# Patient Record
Sex: FEMALE | Race: WHITE | Employment: PART TIME | URBAN - METROPOLITAN AREA
[De-identification: names, ages, dates, MRNs, and addresses within clinical notes are randomized per-mention and may not be internally consistent; named-entity substitution may affect disease eponyms.]

---

## 2018-03-26 ENCOUNTER — HOSPITAL ENCOUNTER (INPATIENT)
Age: 58
LOS: 2 days | Discharge: HOME | DRG: 247 | End: 2018-03-28
Attending: EMERGENCY MEDICINE | Admitting: INTERNAL MEDICINE
Payer: OTHER GOVERNMENT

## 2018-03-26 ENCOUNTER — APPOINTMENT (OUTPATIENT)
Dept: GENERAL RADIOLOGY | Age: 58
DRG: 247 | End: 2018-03-26
Attending: INTERNAL MEDICINE
Payer: OTHER GOVERNMENT

## 2018-03-26 DIAGNOSIS — I25.119 CORONARY ARTERY DISEASE INVOLVING NATIVE HEART WITH ANGINA PECTORIS, UNSPECIFIED VESSEL OR LESION TYPE (HCC): ICD-10-CM

## 2018-03-26 DIAGNOSIS — R07.9 ACUTE CHEST PAIN: Primary | ICD-10-CM

## 2018-03-26 DIAGNOSIS — R77.8 ELEVATED TROPONIN: ICD-10-CM

## 2018-03-26 PROBLEM — E11.9 DIABETES MELLITUS TYPE 2, CONTROLLED (HCC): Status: ACTIVE | Noted: 2018-03-26

## 2018-03-26 PROBLEM — I21.4 NON-STEMI (NON-ST ELEVATED MYOCARDIAL INFARCTION) (HCC): Status: ACTIVE | Noted: 2018-03-26

## 2018-03-26 PROBLEM — I25.10 CAD (CORONARY ARTERY DISEASE), NATIVE CORONARY ARTERY: Status: ACTIVE | Noted: 2018-03-26

## 2018-03-26 LAB
ALBUMIN SERPL-MCNC: 3.2 G/DL (ref 3.4–5)
ALBUMIN/GLOB SERPL: 0.9 {RATIO} (ref 0.8–1.7)
ALP SERPL-CCNC: 124 U/L (ref 45–117)
ALT SERPL-CCNC: 105 U/L (ref 13–56)
ANION GAP SERPL CALC-SCNC: 6 MMOL/L (ref 3–18)
ANION GAP SERPL CALC-SCNC: 9 MMOL/L (ref 3–18)
AST SERPL-CCNC: 44 U/L (ref 15–37)
BASOPHILS # BLD: 0 K/UL (ref 0–0.06)
BASOPHILS NFR BLD: 0 % (ref 0–2)
BILIRUB DIRECT SERPL-MCNC: 0.1 MG/DL (ref 0–0.2)
BILIRUB SERPL-MCNC: 0.3 MG/DL (ref 0.2–1)
BUN SERPL-MCNC: 11 MG/DL (ref 7–18)
BUN SERPL-MCNC: 9 MG/DL (ref 7–18)
BUN/CREAT SERPL: 12 (ref 12–20)
BUN/CREAT SERPL: 14 (ref 12–20)
CALCIUM SERPL-MCNC: 8.6 MG/DL (ref 8.5–10.1)
CALCIUM SERPL-MCNC: 8.6 MG/DL (ref 8.5–10.1)
CHLORIDE SERPL-SCNC: 105 MMOL/L (ref 100–108)
CHLORIDE SERPL-SCNC: 108 MMOL/L (ref 100–108)
CK MB CFR SERPL CALC: 2.3 % (ref 0–4)
CK MB CFR SERPL CALC: 4.4 % (ref 0–4)
CK MB CFR SERPL CALC: 4.6 % (ref 0–4)
CK MB CFR SERPL CALC: 5.1 % (ref 0–4)
CK MB SERPL-MCNC: 3.4 NG/ML (ref 5–25)
CK MB SERPL-MCNC: 7.2 NG/ML (ref 5–25)
CK MB SERPL-MCNC: 8.7 NG/ML (ref 5–25)
CK MB SERPL-MCNC: 9.9 NG/ML (ref 5–25)
CK SERPL-CCNC: 149 U/L (ref 26–192)
CK SERPL-CCNC: 164 U/L (ref 26–192)
CK SERPL-CCNC: 188 U/L (ref 26–192)
CK SERPL-CCNC: 193 U/L (ref 26–192)
CO2 SERPL-SCNC: 27 MMOL/L (ref 21–32)
CO2 SERPL-SCNC: 30 MMOL/L (ref 21–32)
CREAT SERPL-MCNC: 0.73 MG/DL (ref 0.6–1.3)
CREAT SERPL-MCNC: 0.79 MG/DL (ref 0.6–1.3)
D DIMER PPP FEU-MCNC: 0.63 UG/ML(FEU)
DIFFERENTIAL METHOD BLD: ABNORMAL
EOSINOPHIL # BLD: 0.2 K/UL (ref 0–0.4)
EOSINOPHIL NFR BLD: 2 % (ref 0–5)
ERYTHROCYTE [DISTWIDTH] IN BLOOD BY AUTOMATED COUNT: 12.5 % (ref 11.6–14.5)
ERYTHROCYTE [DISTWIDTH] IN BLOOD BY AUTOMATED COUNT: 12.6 % (ref 11.6–14.5)
EST. AVERAGE GLUCOSE BLD GHB EST-MCNC: 111 MG/DL
GLOBULIN SER CALC-MCNC: 3.6 G/DL (ref 2–4)
GLUCOSE SERPL-MCNC: 107 MG/DL (ref 74–99)
GLUCOSE SERPL-MCNC: 136 MG/DL (ref 74–99)
HBA1C MFR BLD: 5.5 % (ref 4.5–5.6)
HCT VFR BLD AUTO: 44.3 % (ref 35–45)
HCT VFR BLD AUTO: 45.4 % (ref 35–45)
HGB BLD-MCNC: 15 G/DL (ref 12–16)
HGB BLD-MCNC: 15.4 G/DL (ref 12–16)
INR PPP: 0.9 (ref 0.8–1.2)
LYMPHOCYTES # BLD: 1.7 K/UL (ref 0.9–3.6)
LYMPHOCYTES NFR BLD: 15 % (ref 21–52)
MCH RBC QN AUTO: 32.2 PG (ref 24–34)
MCH RBC QN AUTO: 32.4 PG (ref 24–34)
MCHC RBC AUTO-ENTMCNC: 33.9 G/DL (ref 31–37)
MCHC RBC AUTO-ENTMCNC: 33.9 G/DL (ref 31–37)
MCV RBC AUTO: 95.1 FL (ref 74–97)
MCV RBC AUTO: 95.4 FL (ref 74–97)
MONOCYTES # BLD: 0.6 K/UL (ref 0.05–1.2)
MONOCYTES NFR BLD: 5 % (ref 3–10)
NEUTS SEG # BLD: 8.5 K/UL (ref 1.8–8)
NEUTS SEG NFR BLD: 78 % (ref 40–73)
PLATELET # BLD AUTO: 200 K/UL (ref 135–420)
PLATELET # BLD AUTO: 210 K/UL (ref 135–420)
PMV BLD AUTO: 9.5 FL (ref 9.2–11.8)
PMV BLD AUTO: 9.7 FL (ref 9.2–11.8)
POTASSIUM SERPL-SCNC: 3.5 MMOL/L (ref 3.5–5.5)
POTASSIUM SERPL-SCNC: 3.8 MMOL/L (ref 3.5–5.5)
PROT SERPL-MCNC: 6.8 G/DL (ref 6.4–8.2)
PROTHROMBIN TIME: 11.9 SEC (ref 11.5–15.2)
RBC # BLD AUTO: 4.66 M/UL (ref 4.2–5.3)
RBC # BLD AUTO: 4.76 M/UL (ref 4.2–5.3)
SODIUM SERPL-SCNC: 138 MMOL/L (ref 136–145)
SODIUM SERPL-SCNC: 147 MMOL/L (ref 136–145)
T4 FREE SERPL-MCNC: 1.7 NG/DL (ref 0.7–1.5)
TROPONIN I SERPL-MCNC: 0.28 NG/ML (ref 0–0.06)
TROPONIN I SERPL-MCNC: 1.02 NG/ML (ref 0–0.06)
TROPONIN I SERPL-MCNC: 1.14 NG/ML (ref 0–0.06)
TROPONIN I SERPL-MCNC: 1.44 NG/ML (ref 0–0.06)
TSH SERPL DL<=0.05 MIU/L-ACNC: 0.25 UIU/ML (ref 0.36–3.74)
WBC # BLD AUTO: 10.9 K/UL (ref 4.6–13.2)
WBC # BLD AUTO: 9.3 K/UL (ref 4.6–13.2)

## 2018-03-26 PROCEDURE — 93970 EXTREMITY STUDY: CPT

## 2018-03-26 PROCEDURE — 85379 FIBRIN DEGRADATION QUANT: CPT | Performed by: INTERNAL MEDICINE

## 2018-03-26 PROCEDURE — 36415 COLL VENOUS BLD VENIPUNCTURE: CPT | Performed by: INTERNAL MEDICINE

## 2018-03-26 PROCEDURE — 83036 HEMOGLOBIN GLYCOSYLATED A1C: CPT | Performed by: INTERNAL MEDICINE

## 2018-03-26 PROCEDURE — 80048 BASIC METABOLIC PNL TOTAL CA: CPT | Performed by: INTERNAL MEDICINE

## 2018-03-26 PROCEDURE — 99284 EMERGENCY DEPT VISIT MOD MDM: CPT

## 2018-03-26 PROCEDURE — 93306 TTE W/DOPPLER COMPLETE: CPT

## 2018-03-26 PROCEDURE — 71045 X-RAY EXAM CHEST 1 VIEW: CPT

## 2018-03-26 PROCEDURE — 84443 ASSAY THYROID STIM HORMONE: CPT | Performed by: INTERNAL MEDICINE

## 2018-03-26 PROCEDURE — 74011250636 HC RX REV CODE- 250/636: Performed by: EMERGENCY MEDICINE

## 2018-03-26 PROCEDURE — 74011250637 HC RX REV CODE- 250/637: Performed by: INTERNAL MEDICINE

## 2018-03-26 PROCEDURE — 65660000000 HC RM CCU STEPDOWN

## 2018-03-26 PROCEDURE — 85610 PROTHROMBIN TIME: CPT | Performed by: INTERNAL MEDICINE

## 2018-03-26 PROCEDURE — 80076 HEPATIC FUNCTION PANEL: CPT | Performed by: INTERNAL MEDICINE

## 2018-03-26 PROCEDURE — 85025 COMPLETE CBC W/AUTO DIFF WBC: CPT | Performed by: EMERGENCY MEDICINE

## 2018-03-26 PROCEDURE — 82550 ASSAY OF CK (CPK): CPT | Performed by: EMERGENCY MEDICINE

## 2018-03-26 PROCEDURE — 84439 ASSAY OF FREE THYROXINE: CPT | Performed by: INTERNAL MEDICINE

## 2018-03-26 PROCEDURE — 93005 ELECTROCARDIOGRAM TRACING: CPT

## 2018-03-26 PROCEDURE — 80048 BASIC METABOLIC PNL TOTAL CA: CPT | Performed by: EMERGENCY MEDICINE

## 2018-03-26 PROCEDURE — 96372 THER/PROPH/DIAG INJ SC/IM: CPT

## 2018-03-26 PROCEDURE — 74011250636 HC RX REV CODE- 250/636: Performed by: INTERNAL MEDICINE

## 2018-03-26 PROCEDURE — 85027 COMPLETE CBC AUTOMATED: CPT | Performed by: INTERNAL MEDICINE

## 2018-03-26 PROCEDURE — 94762 N-INVAS EAR/PLS OXIMTRY CONT: CPT

## 2018-03-26 RX ORDER — INSULIN LISPRO 100 [IU]/ML
INJECTION, SOLUTION INTRAVENOUS; SUBCUTANEOUS
Status: DISCONTINUED | OUTPATIENT
Start: 2018-03-26 | End: 2018-03-26

## 2018-03-26 RX ORDER — ENOXAPARIN SODIUM 100 MG/ML
1 INJECTION SUBCUTANEOUS EVERY 12 HOURS
Status: DISCONTINUED | OUTPATIENT
Start: 2018-03-26 | End: 2018-03-26

## 2018-03-26 RX ORDER — SODIUM CHLORIDE 0.9 % (FLUSH) 0.9 %
5-10 SYRINGE (ML) INJECTION AS NEEDED
Status: DISCONTINUED | OUTPATIENT
Start: 2018-03-26 | End: 2018-03-28 | Stop reason: HOSPADM

## 2018-03-26 RX ORDER — ASPIRIN 325 MG
325 TABLET ORAL DAILY
COMMUNITY
End: 2018-03-28

## 2018-03-26 RX ORDER — SODIUM CHLORIDE 0.9 % (FLUSH) 0.9 %
5-10 SYRINGE (ML) INJECTION EVERY 8 HOURS
Status: DISCONTINUED | OUTPATIENT
Start: 2018-03-26 | End: 2018-03-28 | Stop reason: HOSPADM

## 2018-03-26 RX ORDER — SIMVASTATIN 20 MG/1
20 TABLET, FILM COATED ORAL
Status: DISCONTINUED | OUTPATIENT
Start: 2018-03-26 | End: 2018-03-28 | Stop reason: HOSPADM

## 2018-03-26 RX ORDER — GUAIFENESIN 100 MG/5ML
81 LIQUID (ML) ORAL DAILY
Status: DISCONTINUED | OUTPATIENT
Start: 2018-03-27 | End: 2018-03-26 | Stop reason: SDUPTHER

## 2018-03-26 RX ORDER — NITROGLYCERIN 0.4 MG/1
0.4 TABLET SUBLINGUAL
Status: DISCONTINUED | OUTPATIENT
Start: 2018-03-26 | End: 2018-03-28 | Stop reason: HOSPADM

## 2018-03-26 RX ORDER — DEXTROSE 50 % IN WATER (D50W) INTRAVENOUS SYRINGE
25-50 AS NEEDED
Status: DISCONTINUED | OUTPATIENT
Start: 2018-03-26 | End: 2018-03-28 | Stop reason: HOSPADM

## 2018-03-26 RX ORDER — ENOXAPARIN SODIUM 100 MG/ML
1 INJECTION SUBCUTANEOUS
Status: COMPLETED | OUTPATIENT
Start: 2018-03-26 | End: 2018-03-26

## 2018-03-26 RX ORDER — LEVOTHYROXINE SODIUM 125 UG/1
125 TABLET ORAL
Status: DISCONTINUED | OUTPATIENT
Start: 2018-03-26 | End: 2018-03-28 | Stop reason: HOSPADM

## 2018-03-26 RX ORDER — ASPIRIN 325 MG
325 TABLET ORAL DAILY
Status: DISCONTINUED | OUTPATIENT
Start: 2018-03-26 | End: 2018-03-26

## 2018-03-26 RX ORDER — MORPHINE SULFATE 4 MG/ML
5 INJECTION INTRAVENOUS
Status: DISCONTINUED | OUTPATIENT
Start: 2018-03-26 | End: 2018-03-28 | Stop reason: HOSPADM

## 2018-03-26 RX ORDER — LISINOPRIL 5 MG/1
5 TABLET ORAL DAILY
Status: DISCONTINUED | OUTPATIENT
Start: 2018-03-26 | End: 2018-03-28 | Stop reason: HOSPADM

## 2018-03-26 RX ORDER — ACETAMINOPHEN 500 MG
500 TABLET ORAL
Status: DISCONTINUED | OUTPATIENT
Start: 2018-03-26 | End: 2018-03-28 | Stop reason: HOSPADM

## 2018-03-26 RX ORDER — DOCUSATE SODIUM 100 MG/1
100 CAPSULE, LIQUID FILLED ORAL 2 TIMES DAILY
Status: DISCONTINUED | OUTPATIENT
Start: 2018-03-26 | End: 2018-03-28 | Stop reason: HOSPADM

## 2018-03-26 RX ORDER — CARVEDILOL 3.12 MG/1
6.25 TABLET ORAL 2 TIMES DAILY WITH MEALS
Status: DISCONTINUED | OUTPATIENT
Start: 2018-03-26 | End: 2018-03-28 | Stop reason: HOSPADM

## 2018-03-26 RX ORDER — LEVOTHYROXINE SODIUM 125 UG/1
125 TABLET ORAL
COMMUNITY
End: 2018-11-09

## 2018-03-26 RX ORDER — ENOXAPARIN SODIUM 100 MG/ML
30 INJECTION SUBCUTANEOUS ONCE
Status: DISCONTINUED | OUTPATIENT
Start: 2018-03-26 | End: 2018-03-26

## 2018-03-26 RX ORDER — SODIUM CHLORIDE 9 MG/ML
75 INJECTION, SOLUTION INTRAVENOUS CONTINUOUS
Status: DISCONTINUED | OUTPATIENT
Start: 2018-03-27 | End: 2018-03-28 | Stop reason: HOSPADM

## 2018-03-26 RX ORDER — MAGNESIUM SULFATE 100 %
4 CRYSTALS MISCELLANEOUS AS NEEDED
Status: DISCONTINUED | OUTPATIENT
Start: 2018-03-26 | End: 2018-03-28 | Stop reason: HOSPADM

## 2018-03-26 RX ORDER — ADHESIVE BANDAGE
30 BANDAGE TOPICAL DAILY PRN
Status: DISCONTINUED | OUTPATIENT
Start: 2018-03-26 | End: 2018-03-28 | Stop reason: HOSPADM

## 2018-03-26 RX ORDER — GUAIFENESIN 100 MG/5ML
81 LIQUID (ML) ORAL DAILY
Status: DISCONTINUED | OUTPATIENT
Start: 2018-03-27 | End: 2018-03-28 | Stop reason: HOSPADM

## 2018-03-26 RX ORDER — NITROGLYCERIN 0.4 MG/1
TABLET SUBLINGUAL
COMMUNITY

## 2018-03-26 RX ADMIN — CARVEDILOL 6.25 MG: 3.12 TABLET, FILM COATED ORAL at 08:36

## 2018-03-26 RX ADMIN — Medication 10 ML: at 17:26

## 2018-03-26 RX ADMIN — ASPIRIN 325 MG ORAL TABLET 325 MG: 325 PILL ORAL at 08:36

## 2018-03-26 RX ADMIN — Medication 10 ML: at 06:50

## 2018-03-26 RX ADMIN — ENOXAPARIN SODIUM 80 MG: 80 INJECTION SUBCUTANEOUS at 03:11

## 2018-03-26 RX ADMIN — SIMVASTATIN 20 MG: 20 TABLET, FILM COATED ORAL at 06:48

## 2018-03-26 RX ADMIN — Medication 10 ML: at 22:43

## 2018-03-26 RX ADMIN — LISINOPRIL 5 MG: 5 TABLET ORAL at 08:36

## 2018-03-26 RX ADMIN — SIMVASTATIN 20 MG: 20 TABLET, FILM COATED ORAL at 22:43

## 2018-03-26 RX ADMIN — LEVOTHYROXINE SODIUM 125 MCG: 125 TABLET ORAL at 06:48

## 2018-03-26 RX ADMIN — ENOXAPARIN SODIUM 80 MG: 80 INJECTION SUBCUTANEOUS at 17:26

## 2018-03-26 RX ADMIN — CARVEDILOL 6.25 MG: 3.12 TABLET, FILM COATED ORAL at 17:26

## 2018-03-26 NOTE — PROGRESS NOTES
4981 Assumed care of pt from [facility] 76. . Pt resting quietly in bed , no signs of distress, call bell within reach. 0830 Assessment completed. Pt denies chest pain or discomfort. Agitated because she is on a diabetic consistent carb diet and is not diabetic, will get it changed. -per Dr Owens Him okay to changed diet to Regular     1408 Critical troponin 1.44, Dr Aidan Beltre and Dr Denia Cornell aware, no orders received. 1740 Critical troponin 1.14. Paged Dr Denia Cornell    Shift Summary- Shift uneventful. Pt rested throughout shift. Denied chest pain or shortness of breath.  Per Dr Denia Cornell okay to not call him for critical troponin's unless they start trending back up

## 2018-03-26 NOTE — ED PROVIDER NOTES
EMERGENCY DEPARTMENT HISTORY AND PHYSICAL EXAM    Date: 3/26/2018  Patient Name: Stephanie Lopez    History of Presenting Illness     Chief Complaint   Patient presents with    Chest Pain         History Provided By: Patient    Chief Complaint: Chest pain that radiates to the jaw  Duration: 3 hours   Timing:  Acute and Improving  Location: Chest and Jaw  Quality: Pressure  Severity: 5 out of 10  Modifying Factors: Notes great relief with NTG (given by cardiologist) and ASA. Associated Symptoms: denies any other associated signs or symptoms    Additional History (Context):   1:33 AM  Stephanie Lopez is a 61 y.o. female with PMHx HTN, DM, and thyroid disease who presents ambulatory to the emergency department C/O constant, pressured, chest pain that radiates to the jaw (rated 5/10), onset 3 hours ago which resolved in ER. Notes great relief with NTG (given by cardiologist) and ASA taken 2 hours ago. Notes no other associated sxs. Pt is a  and states that pain began at work. Pt was evaluated by cardiology in 2008, had a stress test and cardiac catheterization that revealed a minor blockage which did require treatment. Endorses tobacco use (1 ppd). Notes FHx of CHF in father (< 61). Denies any h/o DM. Pt denies SOB, diaphoresis, N/V, heavy lifting, physical exertion, EtOH/illicit drug use, or any other sxs or complaints. PCP: None    Past History     Past Medical History:  Past Medical History:   Diagnosis Date    Diabetes ([address]     Endocrine disease     Hypertension        Past Surgical History:  Past Surgical History:   Procedure Laterality Date    HX GYN      HX ORTHOPAEDIC         Family History:  History reviewed. No pertinent family history.     Social History:  Social History   Substance Use Topics    Smoking status: Current Every Day Smoker     Packs/day: 1.00    Smokeless tobacco: Never Used    Alcohol use No       Allergies:  No Known Allergies      Review of Systems   Review of Systems   Constitutional: Negative for chills, diaphoresis, fever and unexpected weight change. HENT: Negative for congestion, drooling, ear pain, rhinorrhea, sore throat, tinnitus and trouble swallowing.         (+) Jaw pain   Eyes: Negative for photophobia, pain, redness and visual disturbance. Respiratory: Negative for cough, choking, chest tightness, shortness of breath, wheezing and stridor. Cardiovascular: Positive for chest pain. Negative for palpitations and leg swelling. Gastrointestinal: Negative for abdominal distention, abdominal pain, anal bleeding, blood in stool, constipation, diarrhea, nausea and vomiting. Genitourinary: Negative for difficulty urinating, dysuria, flank pain, frequency, hematuria and urgency. Musculoskeletal: Negative for arthralgias, back pain and neck pain. Skin: Negative for color change, rash and wound. Neurological: Negative for dizziness, seizures, syncope, speech difficulty, light-headedness and headaches. Hematological: Does not bruise/bleed easily. Psychiatric/Behavioral: Negative for agitation, behavioral problems, hallucinations, self-injury and suicidal ideas. The patient is not hyperactive. Physical Exam     Vitals:    03/26/18 0101 03/26/18 0245   BP: (!) 183/103 156/87   Pulse: 79 78   Resp: 16 18   Temp: 97.9 °F (36.6 °C)    SpO2: 99% 96%   Weight: 81.6 kg (180 lb)    Height: 5' 5\" (1.651 m)      Physical Exam   Constitutional: She is oriented to person, place, and time. She appears well-developed and well-nourished. No distress. HENT:   Head: Normocephalic and atraumatic. Right Ear: External ear normal.   Left Ear: External ear normal.   Mouth/Throat: Oropharynx is clear and moist. No oropharyngeal exudate. Eyes: Conjunctivae and EOM are normal. Pupils are equal, round, and reactive to light. No scleral icterus. No pallor   Neck: Normal range of motion. Neck supple. No JVD present. No tracheal deviation present.  No thyromegaly present. Cardiovascular: Normal rate, regular rhythm and normal heart sounds. Pulmonary/Chest: Effort normal and breath sounds normal. No stridor. No respiratory distress. Abdominal: Soft. Bowel sounds are normal. She exhibits no distension. There is no tenderness. There is no rebound and no guarding. Musculoskeletal: Normal range of motion. She exhibits no edema or tenderness. No soft tissue injuries   Lymphadenopathy:     She has no cervical adenopathy. Neurological: She is alert and oriented to person, place, and time. She has normal reflexes. No cranial nerve deficit. Coordination normal.   Skin: Skin is warm and dry. No rash noted. She is not diaphoretic. No erythema. Psychiatric: She has a normal mood and affect. Her behavior is normal. Judgment and thought content normal.   Nursing note and vitals reviewed.     Diagnostic Study Results     Labs -     Recent Results (from the past 12 hour(s))   EKG, 12 LEAD, INITIAL    Collection Time: 03/26/18 12:56 AM   Result Value Ref Range    Ventricular Rate 76 BPM    Atrial Rate 76 BPM    P-R Interval 138 ms    QRS Duration 84 ms    Q-T Interval 402 ms    QTC Calculation (Bezet) 452 ms    Calculated P Axis 61 degrees    Calculated R Axis 23 degrees    Calculated T Axis 47 degrees    Diagnosis       Normal sinus rhythm  Possible Left atrial enlargement  Borderline ECG  When compared with ECG of 28-FEB-2010 17:13,  No significant change was found     METABOLIC PANEL, BASIC    Collection Time: 03/26/18  1:30 AM   Result Value Ref Range    Sodium 147 (H) 136 - 145 mmol/L    Potassium 3.8 3.5 - 5.5 mmol/L    Chloride 108 100 - 108 mmol/L    CO2 30 21 - 32 mmol/L    Anion gap 9 3.0 - 18 mmol/L    Glucose 136 (H) 74 - 99 mg/dL    BUN 11 7.0 - 18 MG/DL    Creatinine 0.79 0.6 - 1.3 MG/DL    BUN/Creatinine ratio 14 12 - 20      GFR est AA >60 >60 ml/min/1.73m2    GFR est non-AA >60 >60 ml/min/1.73m2    Calcium 8.6 8.5 - 10.1 MG/DL   CBC WITH AUTOMATED DIFF Collection Time: 03/26/18  1:30 AM   Result Value Ref Range    WBC 10.9 4.6 - 13.2 K/uL    RBC 4.76 4.20 - 5.30 M/uL    HGB 15.4 12.0 - 16.0 g/dL    HCT 45.4 (H) 35.0 - 45.0 %    MCV 95.4 74.0 - 97.0 FL    MCH 32.4 24.0 - 34.0 PG    MCHC 33.9 31.0 - 37.0 g/dL    RDW 12.5 11.6 - 14.5 %    PLATELET 343 769 - 986 K/uL    MPV 9.5 9.2 - 11.8 FL    NEUTROPHILS 78 (H) 40 - 73 %    LYMPHOCYTES 15 (L) 21 - 52 %    MONOCYTES 5 3 - 10 %    EOSINOPHILS 2 0 - 5 %    BASOPHILS 0 0 - 2 %    ABS. NEUTROPHILS 8.5 (H) 1.8 - 8.0 K/UL    ABS. LYMPHOCYTES 1.7 0.9 - 3.6 K/UL    ABS. MONOCYTES 0.6 0.05 - 1.2 K/UL    ABS. EOSINOPHILS 0.2 0.0 - 0.4 K/UL    ABS. BASOPHILS 0.0 0.0 - 0.06 K/UL    DF AUTOMATED     CARDIAC PANEL,(CK, CKMB & TROPONIN)    Collection Time: 03/26/18  1:30 AM   Result Value Ref Range     26 - 192 U/L    CK - MB 3.4 <3.6 ng/ml    CK-MB Index 2.3 0.0 - 4.0 %    Troponin-I, Qt. 0.28 (H) 0.00 - 0.06 NG/ML       Radiologic Studies -    No orders to display     CT Results  (Last 48 hours)    None        CXR Results  (Last 48 hours)    None            Medical Decision Making   I am the first provider for this patient. I reviewed the vital signs, available nursing notes, past medical history, past surgical history, family history and social history. Vital Signs-Reviewed the patient's vital signs. Pulse Oximetry Analysis - 99% on RA     Records Reviewed: Nursing Notes    Provider Notes (Medical Decision Making):   Ddx: Strong likelihood of ACS, though more stable angina than unstable angina. Risks and benefits of serial enzymes discussed, though shes only staying for one. Will refer to cardiology. Procedures:  Procedures    MEDICATIONS GIVEN:  Medications   sodium chloride (NS) flush 5-10 mL (not administered)   sodium chloride (NS) flush 5-10 mL (not administered)   enoxaparin (LOVENOX) injection 80 mg (80 mg SubCUTAneous Given 3/26/18 0311)       ED Course:   1:33 AM   Initial assessment performed.  The patients presenting problems have been discussed, and they are in agreement with the care plan formulated and outlined with them. I have encouraged them to ask questions as they arise throughout their visit. 2:40 AM  Pt agrees to stay for further evaluation. 3:03 AM Discussed patient's history, exam, and available diagnostics results with Massimo Carter MD, hospitalist, who agree with admission to cardiac telemetry. 6:27 AM Discussed patient's history, exam, and available diagnostics results with Silverio Viramontes MD, cardiology, who will see pt in consult. Diagnosis and Disposition       Critical Care Time: None    Core Measures:  For Hospitalized Patients:    1. Hospitalization Decision Time:  The decision to hospitalize the patient was made by Caity Herndon. Chelsi Garsia MD at 3:12 AM on 3/26/2018    2. Aspirin: Aspirin was not given because the patient took her own less than 12 hours ago. 3:12 AM  Patient is being admitted to the hospital by Massimo Carter MD. The results of their tests and reasons for their admission have been discussed with them and/or available family. They convey agreement and understanding for the need to be admitted and for their admission diagnosis. CONDITIONS ON ADMISSION:  Sepsis is not present at the time of admission. Deep Vein Thrombosis is not present at the time of admission. Thrombosis is not present at the time of admission. Urinary Tract Infection is not present at the time of admission. Pneumonia is not present at the time of admission. MRSA is not present at the time of admission. Wound infection is not present at the time of admission. Pressure Ulcer is not present at the time of admission. CLINICAL IMPRESSION:    1. Acute chest pain    2.  Coronary artery disease involving native heart with angina pectoris, unspecified vessel or lesion type (HCC)    3. Elevated troponin        PLAN: Admit to cardiac telemetry  __________________________    Attestations: This note is prepared by Norris Pappas, acting as Scribe for Swati. Chelsi Garsia MD.    Swati. Chelsi Garsia MD:  The scribe's documentation has been prepared under my direction and personally reviewed by me in its entirety.   I confirm that the note above accurately reflects all work, treatment, procedures, and medical decision making performed by me.  _______________________________

## 2018-03-26 NOTE — ROUTINE PROCESS
TRANSFER - OUT REPORT:    Verbal report given to Dorothy La RN on Krysta Jiang  being transferred to telemetry for routine progression of care       Report consisted of patients Situation, Background, Assessment and   Recommendations(SBAR). Information from the following report(s) SBAR, ED Summary and MAR was reviewed with the receiving nurse. Lines:   Peripheral IV 03/26/18 Left Antecubital (Active)   Site Assessment Clean, dry, & intact 3/26/2018  1:42 AM   Phlebitis Assessment 0 3/26/2018  1:42 AM   Infiltration Assessment 0 3/26/2018  1:42 AM   Dressing Status Clean, dry, & intact 3/26/2018  1:42 AM   Dressing Type Transparent 3/26/2018  1:42 AM   Hub Color/Line Status Pink 3/26/2018  1:42 AM   Action Taken Catheter retaped 3/26/2018  1:42 AM        Opportunity for questions and clarification was provided.       Patient transported with:   [employer]

## 2018-03-26 NOTE — IP AVS SNAPSHOT
Edwige Belch 
 
 
 [address] [facility] [address] 
[phone] Patient: Amrita Bonds MRN: [id number] QPF:[birth year] A check mekhi indicates which time of day the medication should be taken. My Medications START taking these medications Instructions Each Dose to Equal  
 Morning Noon Evening Bedtime  
 aspirin 81 mg chewable tablet Start taking on:  3/29/2018 Replaces:  aspirin 325 mg tablet Your last dose was: Your next dose is: Take 1 Tab by mouth daily. 81 mg  
    
   
   
   
  
 carvedilol 6.25 mg tablet Commonly known as:  Octavio Ott Your last dose was: Your next dose is: Take 1 Tab by mouth two (2) times daily (with meals). 6.25 mg  
    
   
   
   
  
 lisinopril 5 mg tablet Commonly known as:  Stephanie Ibrahim Start taking on:  3/29/2018 Your last dose was: Your next dose is: Take 1 Tab by mouth daily. 5 mg  
    
   
   
   
  
 simvastatin 20 mg tablet Commonly known as:  ZOCOR Your last dose was: Your next dose is: Take 1 Tab by mouth nightly. 20 mg  
    
   
   
   
  
 ticagrelor 90 mg tablet Commonly known as:  [employer] Your last dose was: Your next dose is: Take 1 Tab by mouth two (2) times a day. 90 mg CONTINUE taking these medications Instructions Each Dose to Equal  
 Morning Noon Evening Bedtime  
 nitroglycerin 0.4 mg SL tablet Commonly known as:  NITROSTAT Your last dose was: Your next dose is:    
   
   
 by SubLINGual route every five (5) minutes as needed for Chest Pain. SYNTHROID 125 mcg tablet Generic drug:  levothyroxine Your last dose was: Your next dose is: Take 125 mcg by mouth Daily (before breakfast). 125 mcg STOP taking these medications   
 aspirin 325 mg tablet Commonly known as:  ASPIRIN Replaced by:  aspirin 81 mg chewable tablet Where to Get Your Medications These medications were sent to [address]  [address] [address] Hours:  M-F 930am-6pm Phone:  [phone]  
  ticagrelor 90 mg tablet Information on where to get these meds will be given to you by the nurse or doctor. ! Ask your nurse or doctor about these medications  
  aspirin 81 mg chewable tablet  
 carvedilol 6.25 mg tablet  
 lisinopril 5 mg tablet  
 simvastatin 20 mg tablet

## 2018-03-26 NOTE — PROGRESS NOTES
TRANSFER - IN REPORT:    Verbal report received from Darya Mendez R.YISEL on Fouzia Pate  being received from Emergency Dept. for routine progression of care      Report consisted of patients Situation, Background, Assessment and   Recommendations(SBAR). Information from the following report(s) SBAR, Kardex, ED Summary, Procedure Summary, Intake/Output, MAR, Recent Results and Cardiac Rhythm NSR was reviewed with the receiving nurse. Opportunity for questions and clarification was provided. Assessment completed upon patients arrival to unit and care assumed. 26: Assumed patient care, she was alert and oriented to person, place, time and situation. Respiratory status was stable on room air. Vital signs were stable. MEWS score was a one. Patient denied any nausea vomiting dizziness or anxiety. White board was updated and explained. Bed was locked and in lowest position. Call bell, water and personal belongings were within reach. Patient had no questions, comments or concerns after bedside shift report.    4226: Patient's troponin level was 1.02, so this nurse called the Hospitalist . The doctor was informed of the patient's lab result. Lab results were read back and verified. No new orders were received at that time. 0700: Patient had an uneventful shift. Respiratory status, vital signs and MEWS score remained stable. Patient was resting quietly with no signs of distress noted. Bed locked and in lowest position. Call bell water and personal belongings were within reach. Patient had no questions, comments or concerns after bedside shift report.  Bedside report given to [address] [facility]

## 2018-03-26 NOTE — PROCEDURES
[facility]  *** FINAL REPORT ***    Name: Rommel Sibley  MRN: [id number]    Inpatient  : 06 May 1960  HIS Order #: [id number]  [address] [facility] Visit #: [id number]  Date: 26 Mar 2018    TYPE OF TEST: Peripheral Venous Testing    REASON FOR TEST  Suspected pulmonary embolism    Right Leg:-  Deep venous thrombosis:           No  Superficial venous thrombosis:    No  Deep venous insufficiency:        Not examined  Superficial venous insufficiency: Not examined    Left Leg:-  Deep venous thrombosis:           No  Superficial venous thrombosis:    No  Deep venous insufficiency:        Not examined  Superficial venous insufficiency: Not examined      INTERPRETATION/FINDINGS  Duplex images were obtained using 2-D gray scale, color flow, and  spectral Doppler analysis. Right leg :  1. Deep vein(s) visualized include the common femoral, deep femoral,  proximal femoral, mid femoral, distal femoral, popliteal(above knee),  popliteal(fossa), popliteal(below knee), posterior tibial and peroneal   veins. 2. No evidence of deep venous thrombosis detected in the veins  visualized. 3. Superficial vein(s) visualized include the great saphenous vein. 4. No evidence of superficial thrombosis detected. Left leg :  1. Deep vein(s) visualized include the common femoral, deep femoral,  proximal femoral, mid femoral, distal femoral, popliteal(above knee),  popliteal(fossa), popliteal(below knee), posterior tibial and peroneal   veins. 2. No evidence of deep venous thrombosis detected in the veins  visualized. 3. Superficial vein(s) visualized include the great saphenous vein. 4. No evidence of superficial thrombosis detected. ADDITIONAL COMMENTS    I have personally reviewed the data relevant to the interpretation of  this  study. TECHNOLOGIST: Sissy Ortiz  Signed: 2018 11:58 AM    PHYSICIAN: Simona Charles.  Samira Tamayo MD  Signed: 2018 03:57 PM

## 2018-03-26 NOTE — PROGRESS NOTES
Readmission Risk Assessment: Low Risk and [facility] patients    RRAT Score: 1 - 12    Initial Assessment: chart reviewed, pt admitted for chest pain, NSTEMI, cardiology consulted. CM will follow for needs at discharge, currently qualifies for [facility] visit for MI, will follow for additional needs. Emergency Contact:     Pertinent Medical Hx:  DM, HTN,     PCP/Specialists:  None- will follow up with pt     Community Services:     DME:  To be determined    Low Risk Care Transition Plan:  1. Evaluate for [facility] or [address] coordination of resources  2. Involve patient/caregiver in assessment, planning, education and implement of intervention. 3. CM daily patient care huddles/interdisciplinary rounds. 4. PCP/Specialist appointment within 7 - 10 days made prior to discharge. 5. Facilitate transportation and logistics for follow-up appointments. 6. Handoff to [address] Nurse Navigator or PCP practice.

## 2018-03-26 NOTE — IP AVS SNAPSHOT
[address] [address] 
[phone] Patient: Kalyn Coates MRN: [id number] BNO:1/7/1886 About your hospitalization You were admitted on:  March 26, 2018 You last received care in the:  [address] [place] [address] You were discharged on:  March 28, 2018 Why you were hospitalized Your primary diagnosis was:  Non-Stemi (Non-St Elevated Myocardial Infarction) (Hcc) Your diagnoses also included:  Cad (Coronary Artery Disease), Native Coronary Artery, Acute Chest Pain, Chest Pain, Diabetes Mellitus Type 2, Controlled (Hcc) Follow-up Information Follow up With Details Comments Contact Info Zechariah Ortiz MD On 4/16/2018 Follow-up appointment @ 1:30 p.m. (Appointment time frame was requsted by physician.) [id number] [address] 
[phone] [facility] EMERGENCY DEPT  As needed, If symptoms worsen [address] [place] [id number] 
[phone] Jay Ackerman MD On 3/29/2018 Follow-up appointment @ 10:15 a.m. [address] [place] [address] 
[phone] Discharge Orders None A check mekhi indicates which time of day the medication should be taken. My Medications START taking these medications Instructions Each Dose to Equal  
 Morning Noon Evening Bedtime  
 aspirin 81 mg chewable tablet Start taking on:  3/29/2018 Replaces:  aspirin 325 mg tablet Your last dose was: Your next dose is: Take 1 Tab by mouth daily. 81 mg  
    
   
   
   
  
 carvedilol 6.25 mg tablet Commonly known as:  Wetzel Sloop Your last dose was: Your next dose is: Take 1 Tab by mouth two (2) times daily (with meals). 6.25 mg  
    
   
   
   
  
 lisinopril 5 mg tablet Commonly known as:  Brianna Martínez Start taking on:  3/29/2018 Your last dose was: Your next dose is: Take 1 Tab by mouth daily. 5 mg  
    
   
   
   
  
 simvastatin 20 mg tablet Commonly known as:  ZOCOR Your last dose was: Your next dose is: Take 1 Tab by mouth nightly. 20 mg  
    
   
   
   
  
 ticagrelor 90 mg tablet Commonly known as:  [employer] Your last dose was: Your next dose is: Take 1 Tab by mouth two (2) times a day. 90 mg CONTINUE taking these medications Instructions Each Dose to Equal  
 Morning Noon Evening Bedtime  
 nitroglycerin 0.4 mg SL tablet Commonly known as:  NITROSTAT Your last dose was: Your next dose is:    
   
   
 by SubLINGual route every five (5) minutes as needed for Chest Pain. SYNTHROID 125 mcg tablet Generic drug:  levothyroxine Your last dose was: Your next dose is: Take 125 mcg by mouth Daily (before breakfast). 125 mcg STOP taking these medications   
 aspirin 325 mg tablet Commonly known as:  ASPIRIN Replaced by:  aspirin 81 mg chewable tablet Where to Get Your Medications These medications were sent to [address] [place] [address] [address]  [address] Hours:  M-F 930am-6pm Phone:  [phone]  
  ticagrelor 90 mg tablet Information on where to get these meds will be given to you by the nurse or doctor. ! Ask your nurse or doctor about these medications  
  aspirin 81 mg chewable tablet  
 carvedilol 6.25 mg tablet  
 lisinopril 5 mg tablet  
 simvastatin 20 mg tablet Discharge Instructions DISCHARGE SUMMARY from Nurse PATIENT INSTRUCTIONS: 
 
Recommended activity: Activity as tolerated. *  Please give a list of your current medications to your Primary Care Provider. *  Please update this list whenever your medications are discontinued, doses are 
    changed, or new medications (including over-the-counter products) are added. *  Please carry medication information at all times in case of emergency situations. These are general instructions for a healthy lifestyle: No smoking/ No tobacco products/ Avoid exposure to second hand smoke Surgeon General's Warning:  Quitting smoking now greatly reduces serious risk to your health. Obesity, smoking, and sedentary lifestyle greatly increases your risk for illness A healthy diet, regular physical exercise & weight monitoring are important for maintaining a healthy lifestyle You may be retaining fluid if you have a history of heart failure or if you experience any of the following symptoms:  Weight gain of 3 pounds or more overnight or 5 pounds in a week, increased swelling in our hands or feet or shortness of breath while lying flat in bed. Please call your doctor as soon as you notice any of these symptoms; do not wait until your next office visit. Recognize signs and symptoms of STROKE: 
 
F-face looks uneven A-arms unable to move or move unevenly S-speech slurred or non-existent T-time-call 911 as soon as signs and symptoms begin-DO NOT go Back to bed or wait to see if you get better-TIME IS BRAIN. Warning Signs of HEART ATTACK Call 911 if you have these symptoms: 
? Chest discomfort. Most heart attacks involve discomfort in the center of the chest that lasts more than a few minutes, or that goes away and comes back. It can feel like uncomfortable pressure, squeezing, fullness, or pain. ? Discomfort in other areas of the upper body. Symptoms can include pain or discomfort in one or both arms, the back, neck, jaw, or stomach. ? Shortness of breath with or without chest discomfort. ? Other signs may include breaking out in a cold sweat, nausea, or lightheadedness. Don't wait more than five minutes to call [address]! Fast action can save your life. Calling 911 is almost always the fastest way to get lifesaving treatment. Emergency Medical Services staff can begin treatment when they arrive  up to an hour sooner than if someone gets to the hospital by car. The discharge information has been reviewed with the {PATIENT PARENT GUARDIAN:30158}. The {PATIENT PARENT GUARDIAN:58454} verbalized understanding. Discharge medications reviewed with the {Dishcarge meds reviewed CEUE:11429} and appropriate educational materials and side effects teaching were provided. ___________________________________________________________________________________________________________________________________ Learning About Coronary Artery Disease (CAD) What is coronary artery disease? Coronary artery disease (CAD) occurs when plaque builds up in the arteries that bring oxygen-rich blood to your heart. Plaque is a fatty substance made of cholesterol, calcium, and other substances in the blood. This process is called hardening of the arteries, or atherosclerosis. What happens when you have coronary artery disease? · Plaque may narrow the coronary arteries. Narrowed arteries cause poor blood flow. This can lead to angina symptoms such as chest pain or discomfort. If blood flow is completely blocked, you could have a heart attack. · You can slow CAD and reduce the risk of future problems by making changes in your lifestyle. These include quitting smoking and eating heart-healthy foods. · Treatments for CAD, along with changes in your lifestyle, can help you live a longer and healthier life. How can you prevent coronary artery disease? · Do not smoke. It may be the best thing you can do to prevent heart disease. If you need help quitting, talk to your doctor about stop-smoking programs and medicines. These can increase your chances of quitting for good. · Be active. Get at least 30 minutes of exercise on most days of the week. Walking is a good choice. You also may want to do other activities, such as running, swimming, cycling, or playing tennis or team sports. · Eat heart-healthy foods. Eat more fruits and vegetables and less foods that contain saturated and trans fats. Limit alcohol, sodium, and sweets. · Stay at a healthy weight. Lose weight if you need to. · Manage other health problems such as diabetes, high blood pressure, and high cholesterol. · Manage stress. Stress can hurt your heart. To keep stress low, talk about your problems and feelings. Don't keep your feelings hidden. · If you have talked about it with your doctor, take a low-dose aspirin every day. Aspirin can help certain people lower their risk of a heart attack or stroke. But taking aspirin isn't right for everyone, because it can cause serious bleeding. Do not start taking daily aspirin unless your doctor knows about it. How is coronary artery disease treated? · Your doctor will suggest that you make lifestyle changes. For example, your doctor may ask you to eat healthy foods, quit smoking, lose extra weight, and be more active. · You will have to take medicines. · Your doctor may suggest a procedure to open narrowed or blocked arteries. This is called angioplasty. Or your doctor may suggest using healthy blood vessels to create detours around narrowed or blocked arteries. This is called bypass surgery. Follow-up care is a key part of your treatment and safety. Be sure to make and go to all appointments, and call your doctor if you are having problems. It's also a good idea to know your test results and keep a list of the medicines you take. Where can you learn more? Go to http://peyton-leandra.info/. Enter [phone] in the search box to learn more about \"Learning About Coronary Artery Disease (CAD). \" Current as of: September 21, 2016 Content Version: 11.4 © 5372-1611 [employer]. Care instructions adapted under license by [employer] (which disclaims liability or warranty for this information). If you have questions about a medical condition or this instruction, always ask your healthcare professional. [address] any warranty or liability for your use of this information. Chest Pain: Care Instructions Your Care Instructions There are many things that can cause chest pain. Some are not serious and will get better on their own in a few days. But some kinds of chest pain need more testing and treatment. Your doctor may have recommended a follow-up visit in the next 8 to 12 hours. If you are not getting better, you may need more tests or treatment. Even though your doctor has released you, you still need to watch for any problems. The doctor carefully checked you, but sometimes problems can develop later. If you have new symptoms or if your symptoms do not get better, get medical care right away. If you have worse or different chest pain or pressure that lasts more than 5 minutes or you passed out (lost consciousness), call 911 or seek other emergency help right away. A medical visit is only one step in your treatment. Even if you feel better, you still need to do what your doctor recommends, such as going to all suggested follow-up appointments and taking medicines exactly as directed. This will help you recover and help prevent future problems. How can you care for yourself at home? · Rest until you feel better. · Take your medicine exactly as prescribed. Call your doctor if you think you are having a problem with your medicine. · Do not drive after taking a prescription pain medicine. When should you call for help? Call 911 if: 
? · You passed out (lost consciousness). ? · You have severe difficulty breathing. ? · You have symptoms of a heart attack. These may include: ¨ Chest pain or pressure, or a strange feeling in your chest. 
¨ Sweating. ¨ Shortness of breath. ¨ Nausea or vomiting. ¨ Pain, pressure, or a strange feeling in your back, neck, jaw, or upper belly or in one or both shoulders or arms. ¨ Lightheadedness or sudden weakness. ¨ A fast or irregular heartbeat. After you call 911, the  may tell you to chew 1 adult-strength or 2 to 4 low-dose aspirin. Wait for an ambulance. Do not try to drive yourself. ?Call your doctor today if: 
? · You have any trouble breathing. ? · Your chest pain gets worse. ? · You are dizzy or lightheaded, or you feel like you may faint. ? · You are not getting better as expected. ? · You are having new or different chest pain. Where can you learn more? Go to http://peyton-leandra.info/. Enter A120 in the search box to learn more about \"Chest Pain: Care Instructions. \" Current as of: March 20, 2017 Content Version: 11.4 © 2465-8161 [employer]. Care instructions adapted under license by [employer] (which disclaims liability or warranty for this information). If you have questions about a medical condition or this instruction, always ask your healthcare professional. [address] any warranty or liability for your use of this information. Percutaneous Coronary Intervention: What to Expect at [facility] Your Recovery Percutaneous coronary intervention (PCI) is the name for procedures that are used to open a narrowed or blocked coronary artery. The two most common PCI procedures are coronary angioplasty and coronary stent placement. Your groin or arm may have a bruise and feel sore for a day or two after a percutaneous coronary intervention (PCI). You can do light activities around the house, but nothing strenuous for several days.  
This care sheet gives you a general idea about how long it will take for you to recover. But each person recovers at a different pace. Follow the steps below to get better as quickly as possible. How can you care for yourself at home? Activity ? · If the doctor gave you a sedative: ¨ For 24 hours, don't do anything that requires attention to detail. It takes time for the medicine's effects to completely wear off. ¨ For your safety, do not drive or operate any machinery that could be dangerous. Wait until the medicine wears off and you can think clearly and react easily. ? · Do not do strenuous exercise and do not lift, pull, or push anything heavy until your doctor says it is okay. This may be for a day or two. You can walk around the house and do light activity, such as cooking. ? · If the catheter was placed in your groin, try not to walk up stairs for the first couple of days. ? · If the catheter was placed in your arm near your wrist, do not bend your wrist deeply for the first couple of days. Be careful using your hand to get into and out of a chair or bed. ? · Carry your stent identification card with you at all times. ? · If your doctor recommends it, get more exercise. Walking is a good choice. Bit by bit, increase the amount you walk every day. Try for at least 30 minutes on most days of the week. Diet ? · Drink plenty of fluids to help your body flush out the dye. If you have kidney, heart, or liver disease and have to limit fluids, talk with your doctor before you increase the amount of fluids you drink. ? · Keep eating a heart-healthy diet that has lots of fruits, vegetables, and whole grains. If you have not been eating this way, talk to your doctor. You also may want to talk to a dietitian. This expert can help you to learn about healthy foods and plan meals. Medicines ? · Your doctor will tell you if and when you can restart your medicines. He or she will also give you instructions about taking any new medicines. ? · If you take blood thinners, such as warfarin (Coumadin), clopidogrel (Plavix), or aspirin, be sure to talk to your doctor. He or she will tell you if and when to start taking those medicines again. Make sure that you understand exactly what your doctor wants you to do.  
? · Your doctor will prescribe blood-thinning medicines. You will likely take aspirin plus another antiplatelet, such as clopidogrel (Plavix). It is very important that you take these medicines exactly as directed. These medicines help keep the coronary artery open and reduce your risk of a heart attack. ? · Call your doctor if you think you are having a problem with your medicine. ?Care of the catheter site ? · For 1 or 2 days, keep a bandage over the spot where the catheter was inserted. The bandage probably will fall off in this time. ? · Put ice or a cold pack on the area for 10 to 20 minutes at a time to help with soreness or swelling. Put a thin cloth between the ice and your skin. ? · You may shower 24 to 48 hours after the procedure, if your doctor okays it. Pat the incision dry. ? · Do not soak the catheter site until it is healed. Don't take a bath for 1 week, or until your doctor tells you it isokay. Follow-up care is a key part of your treatment and safety. Be sure to make and go to all appointments, and call your doctor if you are having problems. It's also a good idea to know your test results and keep a list of the medicines you take. When should you call for help? Call 911 anytime you think you may need emergency care. For example, call if: 
? · You passed out (lost consciousness). ? · You have severe trouble breathing. ? · You have sudden chest pain and shortness of breath, or you cough up blood. ? · You have symptoms of a heart attack, such as: ¨ Chest pain or pressure. ¨ Sweating. ¨ Shortness of breath. ¨ Nausea or vomiting.  
¨ Pain that spreads from the chest to the neck, jaw, or one or both shoulders or arms. ¨ Dizziness or lightheadedness. ¨ A fast or uneven pulse. After calling 911, chew 1 adult-strength aspirin. Wait for an ambulance. Do not try to drive yourself. ? · You have been diagnosed with angina, and you have angina symptoms that do not go away with rest or are not getting better within 5 minutes after you take one dose of nitroglycerin. ?Call your doctor now or seek immediate medical care if: 
? · You are bleeding from the area where the catheter was put in your artery. ? · You have a fast-growing, painful lump at the catheter site. ? · You have signs of infection, such as: 
¨ Increased pain, swelling, warmth, or redness. ¨ Red streaks leading from the catheter site. ¨ Pus draining from the catheter site. ¨ A fever. ? · Your leg or arm looks blue or feels cold, numb, or tingly. ? Watch closely for changes in your health, and be sure to contact your doctor if you have any problems. Where can you learn more? Go to http://peyton-leandra.info/. Enter B711 in the search box to learn more about \"Percutaneous Coronary Intervention: What to Expect at Home. \" Current as of: September 21, 2016 Content Version: 11.4 © 5822-8266 [employer]. Care instructions adapted under license by [employer] (which disclaims liability or warranty for this information). If you have questions about a medical condition or this instruction, always ask your healthcare professional. [place] [address] any warranty or liability for your use of this information. MindBites Announcement We are excited to announce that we are making your provider's discharge notes available to you in MindBites. You will see these notes when they are completed and signed by the physician that discharged you from your recent hospital stay.   If you have any questions or concerns about any information you see in LevelUp, please call the Health Information Department where you were seen or reach out to your Primary Care Provider for more information about your plan of care. Introducing [facility]! Alli Martinez introduces LevelUp patient portal. Now you can access parts of your medical record, email your doctor's office, and request medication refills online. 1. In your internet browser, go to [email or web address] 2. Click on the First Time User? Click Here link in the Sign In box. You will see the New Member Sign Up page. 3. Enter your LevelUp Access Code exactly as it appears below. You will not need to use this code after youve completed the sign-up process. If you do not sign up before the expiration date, you must request a new code. · LevelUp Access Code: [email or web address] Expires: 6/26/2018  9:32 AM 
 
4. Enter the last four digits of your Social Security Number (xxxx) and Date of Birth (mm/dd/yyyy) as indicated and click Submit. You will be taken to the next sign-up page. 5. Create a LevelUp ID. This will be your LevelUp login ID and cannot be changed, so think of one that is secure and easy to remember. 6. Create a LevelUp password. You can change your password at any time. 7. Enter your Password Reset Question and Answer. This can be used at a later time if you forget your password. 8. Enter your e-mail address. You will receive e-mail notification when new information is available in [address] 9. Click Sign Up. You can now view and download portions of your medical record. 10. Click the Download Summary menu link to download a portable copy of your medical information. If you have questions, please visit the Frequently Asked Questions section of the LevelUp website. Remember, LevelUp is NOT to be used for urgent needs. For medical emergencies, dial 911. Now available from your iPhone and Android! Introducing Bonifacio English As a [facility] patient, I wanted to make you aware of our electronic visit tool called Bonifacio [employer]. [facility] 24/7 allows you to connect within minutes with a medical provider 24 hours a day, seven days a week via a mobile device or tablet or logging into a secure website from your computer. You can access Bonifacio Nguyenfin from anywhere in the [place]. A virtual visit might be right for you when you have a simple condition and feel like you just dont want to get out of bed, or cant get away from work for an appointment, when your regular [facility] provider is not available (evenings, weekends or holidays), or when youre out of town and need minor care. Electronic visits cost only $49 and if the [facility] 24/7 provider determines a prescription is needed to treat your condition, one can be electronically transmitted to a nearby pharmacy*. Please take a moment to enroll today if you have not already done so. The enrollment process is free and takes just a few minutes. To enroll, please download the [facility] 24/7 ian to your tablet or phone, or visit [email or web address] to enroll on your computer. And, as an [address] [place] [address] patient with a [employer] account, the results of your visits will be scanned into your electronic medical record and your primary care provider will be able to view the scanned results. We urge you to continue to see your regular [facility] provider for your ongoing medical care. And while your primary care provider may not be the one available when you seek a Bonifacio Mcguireangelfin virtual visit, the peace of mind you get from getting a real diagnosis real time can be priceless. For more information on Bonifacio Maynorrosa, view our Frequently Asked Questions (FAQs) at [email or web address]. Sincerely, 
 
Can Crandall MD 
Chief Medical Officer Danilo8 Deborah Bryant *:  certain medications cannot be prescribed via Bonifacio English Unresulted Labs-Please follow up with your PCP about these lab tests Order Current Status EKG, 12 LEAD, INITIAL Preliminary result EKG, 12 LEAD, SUBSEQUENT Preliminary result Providers Seen During Your Hospitalization Provider Specialty Primary office phone Biju Patel MD Emergency Medicine [phone] Gee Schulte MD Internal Medicine [phone] Your Primary Care Physician (PCP) Primary Care Physician Office Phone Office Fax Mamta Serrano [phone] You are allergic to the following No active allergies Recent Documentation Height Weight BMI OB Status Smoking Status 1.651 m 84.8 kg 31.11 kg/m2 Menopause Current Every Day Smoker Emergency Contacts Name Discharge Info Relation Home Work Mobile Nidia Liu DISCHARGE CAREGIVER [3] Son [22]   [phone] Patient Belongings The following personal items are in your possession at time of discharge: 
  Dental Appliances: None  Visual Aid: None      Home Medications: None   Jewelry: None  Clothing: At bedside    Other Valuables: None Discharge Instructions Attachments/References SMOKING: STOPPING (ENGLISH) SMOKING CESSATION: HEALTH BENEFITS: GENERAL INFO (ENGLISH) Patient Handouts Stopping Smoking: Care Instructions Your Care Instructions Cigarette smokers crave the nicotine in cigarettes. Giving it up is much harder than simply changing a habit. Your body has to stop craving the nicotine. It is hard to quit, but you can do it. There are many tools that people use to quit smoking. You may find that combining tools works best for you. There are several steps to quitting. First you get ready to quit. Then you get support to help you.  After that, you learn new skills and behaviors to become a nonsmoker. For many people, a necessary step is getting and using medicine. Your doctor will help you set up the plan that best meets your needs. You may want to attend a smoking cessation program to help you quit smoking. When you choose a program, look for one that has proven success. Ask your doctor for ideas. You will greatly increase your chances of success if you take medicine as well as get counseling or join a cessation program. 
Some of the changes you feel when you first quit tobacco are uncomfortable. Your body will miss the nicotine at first, and you may feel short-tempered and grumpy. You may have trouble sleeping or concentrating. Medicine can help you deal with these symptoms. You may struggle with changing your smoking habits and rituals. The last step is the tricky one: Be prepared for the smoking urge to continue for a time. This is a lot to deal with, but keep at it. You will feel better. Follow-up care is a key part of your treatment and safety. Be sure to make and go to all appointments, and call your doctor if you are having problems. It's also a good idea to know your test results and keep a list of the medicines you take. How can you care for yourself at home? · Ask your family, friends, and coworkers for support. You have a better chance of quitting if you have help and support. · Join a support group, such as Nicotine Anonymous, for people who are trying to quit smoking. · Consider signing up for a smoking cessation program, such as the American [employer] Association's Freedom from Smoking program. 
· Set a quit date. Pick your date carefully so that it is not right in the middle of a big deadline or stressful time. Once you quit, do not even take a puff. Get rid of all ashtrays and lighters after your last cigarette. Clean your house and your clothes so that they do not smell of smoke. · Learn how to be a nonsmoker.  Think about ways you can avoid those things that make you reach for a cigarette. ¨ Avoid situations that put you at greatest risk for smoking. For some people, it is hard to have a drink with friends without smoking. For others, they might skip a coffee break with coworkers who smoke. ¨ Change your daily routine. Take a different route to work or eat a meal in a different place. · Cut down on stress. Calm yourself or release tension by doing an activity you enjoy, such as reading a book, taking a hot bath, or gardening. · Talk to your doctor or pharmacist about nicotine replacement therapy, which replaces the nicotine in your body. You still get nicotine but you do not use tobacco. Nicotine replacement products help you slowly reduce the amount of nicotine you need. These products come in several forms, many of them available over-the-counter: ¨ Nicotine patches ¨ Nicotine gum and lozenges ¨ Nicotine inhaler · Ask your doctor about bupropion (Wellbutrin) or varenicline (Chantix), which are prescription medicines. They do not contain nicotine. They help you by reducing withdrawal symptoms, such as stress and anxiety. · Some people find hypnosis, acupuncture, and massage helpful for ending the smoking habit. · Eat a healthy diet and get regular exercise. Having healthy habits will help your body move past its craving for nicotine. · Be prepared to keep trying. Most people are not successful the first few times they try to quit. Do not get mad at yourself if you smoke again. Make a list of things you learned and think about when you want to try again, such as next week, next month, or next year. Where can you learn more? Go to http://peyton-leandra.info/. Enter H025 in the search box to learn more about \"Stopping Smoking: Care Instructions. \" Current as of: March 20, 2017 Content Version: 11.4 © 2756-2122 [employer].  Care instructions adapted under license by [address] (which disclaims liability or warranty for this information). If you have questions about a medical condition or this instruction, always ask your healthcare professional. [address] any warranty or liability for your use of this information. Learning About Benefits From Quitting Smoking How does quitting smoking make you healthier? If you're thinking about quitting smoking, you may have a few reasons to be smoke-free. Your health may be one of them. · When you quit smoking, you lower your risks for cancer, lung disease, heart attack, stroke, blood vessel disease, and blindness from macular degeneration. · When you're smoke-free, you get sick less often, and you heal faster. You are less likely to get colds, flu, bronchitis, and pneumonia. · As a nonsmoker, you may find that your mood is better and you are less stressed. When and how will you feel healthier? Quitting has real health benefits that start from day 1 of being smoke-free. And the longer you stay smoke-free, the healthier you get and the better you feel. The first hours · After just 20 minutes, your blood pressure and heart rate go down. That means there's less stress on your heart and blood vessels. · Within 12 hours, the level of carbon monoxide in your blood drops back to normal. That makes room for more oxygen. With more oxygen in your body, you may notice that you have more energy than when you smoked. After 2 weeks · Your lungs start to work better. · Your risk of heart attack starts to drop. After 1 month · When your lungs are clear, you cough less and breathe deeper, so it's easier to be active. · Your sense of taste and smell return. That means you can enjoy food more than you have since you started smoking. Over the years · After 1 year, your risk of heart disease is half what it would be if you kept smoking. · After 5 years, your risk of stroke starts to shrink. Within a few years after that, it's about the same as if you'd never smoked. · After 10 years, your risk of dying from lung cancer is cut by about half. And your risk for many other types of cancer is lower too. How would quitting help others in your life? When you quit smoking, you improve the health of everyone who now breathes in your smoke. · Their heart, lung, and cancer risks drop, much like yours. · They are sick less. For babies and small children, living smoke-free means they're less likely to have ear infections, pneumonia, and bronchitis. · If you're a woman who is or will be pregnant someday, quitting smoking means a healthier . · Children who are close to you are less likely to become adult smokers. Where can you learn more? Go to http://peyton-leandra.info/. Enter 052 [id number] [id number] in the search box to learn more about \"Learning About Benefits From Quitting Smoking. \" Current as of: 2017 Content Version: 11.4 © 2989-5746 [employer]. Care instructions adapted under license by [employer] (which disclaims liability or warranty for this information). If you have questions about a medical condition or this instruction, always ask your healthcare professional. [address] any warranty or liability for your use of this information. Please provide this summary of care documentation to your next provider. Signatures-by signing, you are acknowledging that this After Visit Summary has been reviewed with you and you have received a copy. Patient Signature:  ____________________________________________________________ Date:  ____________________________________________________________  
  
David Chaidez Provider Signature:  ____________________________________________________________ Date:  ____________________________________________________________

## 2018-03-26 NOTE — ROUTINE PROCESS
Bedside and Verbal shift change report given to Jolly Garcia RN (oncoming nurse) by Prasad Velazquez RN (offgoing nurse). Report included the following information SBAR, Kardex, ED Summary, Procedure Summary, Intake/Output, MAR, Accordion, Recent Results and Med Rec Status.

## 2018-03-26 NOTE — CONSULTS
Cardiolology  Inpatient Consult      Patient: Krysta Jiang               Sex: female          DOA: 3/26/2018       YOB: 1960      Age:  61 y.o.        LOS:  LOS: 0 days      Krysta Jiang is a 61 y.o. female admitted for Acute chest pain  CAD (coronary artery disease), native coronary artery  Chest pain  Non-STEMI (non-ST elevated myocardial infarction) [facility])     Recommendations:  · Serial Markers  · Echo  · Cardiac cath      Impression:  · Chest pain  · Elevated troponin  · Known CAD based on prior cath  · Diabetes  · Other problems as enumerated below    Patient Active Problem List    Diagnosis Date Noted    CAD (coronary artery disease), native coronary artery 03/26/2018    Acute chest pain 03/26/2018    Non-STEMI (non-ST elevated myocardial infarction) ([facility] [address] 03/26/2018    Chest pain 03/26/2018    Diabetes mellitus type 2, controlled ([facility] [address] 03/26/2018      None  Past Medical History:   Diagnosis Date    [facility]     Endocrine disease     Hypertension       Past Surgical History:   Procedure Laterality Date    HX GYN      HX ORTHOPAEDIC       No Known Allergies   History reviewed. No pertinent family history.    Current Facility-Administered Medications   Medication Dose Route Frequency    sodium chloride (NS) flush 5-10 mL  5-10 mL IntraVENous Q8H    sodium chloride (NS) flush 5-10 mL  5-10 mL IntraVENous PRN    aspirin (ASPIRIN) tablet 325 mg  325 mg Oral DAILY    levothyroxine (SYNTHROID) tablet 125 mcg  125 mcg Oral ACB    nitroglycerin (NITROSTAT) tablet 0.4 mg  0.4 mg SubLINGual Q5MIN PRN    sodium chloride (NS) flush 5-10 mL  5-10 mL IntraVENous Q8H    sodium chloride (NS) flush 5-10 mL  5-10 mL IntraVENous PRN    magnesium hydroxide (MILK OF MAGNESIA) 400 mg/5 mL oral suspension 30 mL  30 mL Oral DAILY PRN    docusate sodium (COLACE) capsule 100 mg  100 mg Oral BID    acetaminophen (TYLENOL) tablet 500 mg  500 mg Oral Q6H PRN    morphine 5 mg  5 mg IntraVENous Q4H PRN    carvedilol (COREG) tablet 6.25 mg  6.25 mg Oral BID WITH MEALS    enoxaparin (LOVENOX) injection 80 mg  1 mg/kg SubCUTAneous Q12H    [START ON 3/27/2018] aspirin chewable tablet 81 mg  81 mg Oral DAILY    lisinopril (PRINIVIL, ZESTRIL) tablet 5 mg  5 mg Oral DAILY    simvastatin (ZOCOR) tablet 20 mg  20 mg Oral QHS    glucose chewable tablet 16 g  4 Tab Oral PRN    glucagon (GLUCAGEN) injection 1 mg  1 mg IntraMUSCular PRN    dextrose (D50W) injection syrg 12.5-25 g  25-50 mL IntraVENous PRN         Review of Symptoms:  Review of Systems  GENERAL: Patient alert, awake and oriented times 3, able to communicate full sentences and not in distress. HEENT: No change in vision, no earache, tinnitus, sore throat or sinus congestion. NECK: No pain or stiffness. PULMONARY: No shortness of breath, cough or wheeze. Cardiovascular: no pnd or orthopnea, +CP  GASTROINTESTINAL: No abdominal pain, nausea, vomiting or diarrhea, melena or bright red blood per rectum. GENITOURINARY: No urinary frequency, urgency, hesitancy or dysuria. MUSCULOSKELETAL: No joint or muscle pain, no back pain, no recent trauma. Chronic leg pain hx of DVT right leg in past   DERMATOLOGIC: No rash, no itching, no lesions. ENDOCRINE: No polyuria, polydipsia, no heat or cold intolerance. No recent change in weight. HEMATOLOGICAL: No anemia or easy bruising or bleeding. NEUROLOGIC: No headache, seizures, numbness, tingling or weakness. Subjective:   Humaira Estrada is a 61 y.o.  female who has HTN, DM , hyperlipedemia, CAD presents with chest pain that felt like a elephant was sitting on her chest radiating to jaw, relieved by aspirin and Nitro  Started while standing and working at [employer]. She had similar pain ten years ago and catherization reveled noncritical CAD . Medical management was recommended. She continues to smoke, has DM, HTN and hyperlipidemia. In ER troponin . 26 EKG no acute ST elevation. She felt better and wanted to go home convinced to stay. She has been pian free since admission. Troponin levels are elevated. .  Cardiac risk factors: extant. Physical Exam    Visit Vitals    /87 (BP 1 Location: Left arm, BP Patient Position: Sitting)    Pulse 64    Temp 98.4 °F (36.9 °C)    Resp 16    Ht 5' 5\" (1.651 m)    Wt 81.6 kg (180 lb)    SpO2 97%    BMI 29.95 kg/m2       General Appearance:  Well developed, well nourished,alert and oriented x 3, and individual in no acute distress. Ears/Nose/Mouth/Throat:   Hearing grossly normal.         Neck: Supple. Chest:   Lungs clear to auscultation bilaterally. Cardiovascular:  Regular rate and rhythm, S1, S2 normal, no murmur. Abdomen:   Soft, non-tender, bowel sounds are active. Extremities: No edema bilaterally. Skin: Warm and dry.      Cardiographics    Telemetry: normal sinus rhythm  ECG: no acute change  Echocardiogram: done, see report    Recent radiology, intake/output and wt reviewed    Labs:   Recent Results (from the past 48 hour(s))   EKG, 12 LEAD, INITIAL    Collection Time: 03/26/18 12:56 AM   Result Value Ref Range    Ventricular Rate 76 BPM    Atrial Rate 76 BPM    P-R Interval 138 ms    QRS Duration 84 ms    Q-T Interval 402 ms    QTC Calculation (Bezet) 452 ms    Calculated P Axis 61 degrees    Calculated R Axis 23 degrees    Calculated T Axis 47 degrees    Diagnosis       Normal sinus rhythm  Possible Left atrial enlargement  Borderline ECG  When compared with ECG of 28-FEB-2010 17:13,  No significant change was found     METABOLIC PANEL, BASIC    Collection Time: 03/26/18  1:30 AM   Result Value Ref Range    Sodium 147 (H) 136 - 145 mmol/L    Potassium 3.8 3.5 - 5.5 mmol/L    Chloride 108 100 - 108 mmol/L    CO2 30 21 - 32 mmol/L    Anion gap 9 3.0 - 18 mmol/L    Glucose 136 (H) 74 - 99 mg/dL    BUN 11 7.0 - 18 MG/DL    Creatinine 0.79 0.6 - 1.3 MG/DL    BUN/Creatinine ratio 14 12 - 20      GFR est AA >60 >60 ml/min/1.73m2    GFR est non-AA >60 >60 ml/min/1.73m2    Calcium 8.6 8.5 - 10.1 MG/DL   CBC WITH AUTOMATED DIFF    Collection Time: 03/26/18  1:30 AM   Result Value Ref Range    WBC 10.9 4.6 - 13.2 K/uL    RBC 4.76 4.20 - 5.30 M/uL    HGB 15.4 12.0 - 16.0 g/dL    HCT 45.4 (H) 35.0 - 45.0 %    MCV 95.4 74.0 - 97.0 FL    MCH 32.4 24.0 - 34.0 PG    MCHC 33.9 31.0 - 37.0 g/dL    RDW 12.5 11.6 - 14.5 %    PLATELET 183 488 - 500 K/uL    MPV 9.5 9.2 - 11.8 FL    NEUTROPHILS 78 (H) 40 - 73 %    LYMPHOCYTES 15 (L) 21 - 52 %    MONOCYTES 5 3 - 10 %    EOSINOPHILS 2 0 - 5 %    BASOPHILS 0 0 - 2 %    ABS. NEUTROPHILS 8.5 (H) 1.8 - 8.0 K/UL    ABS. LYMPHOCYTES 1.7 0.9 - 3.6 K/UL    ABS. MONOCYTES 0.6 0.05 - 1.2 K/UL    ABS. EOSINOPHILS 0.2 0.0 - 0.4 K/UL    ABS.  BASOPHILS 0.0 0.0 - 0.06 K/UL    DF AUTOMATED     CARDIAC PANEL,(CK, CKMB & TROPONIN)    Collection Time: 03/26/18  1:30 AM   Result Value Ref Range     26 - 192 U/L    CK - MB 3.4 <3.6 ng/ml    CK-MB Index 2.3 0.0 - 4.0 %    Troponin-I, Qt. 0.28 (H) 0.00 - 0.06 NG/ML   D DIMER    Collection Time: 03/26/18  1:30 AM   Result Value Ref Range    D DIMER 0.63 (H) <0.46 ug/ml(FEU)   TSH 3RD GENERATION    Collection Time: 03/26/18  1:30 AM   Result Value Ref Range    TSH 0.25 (L) 0.36 - 3.74 uIU/mL   PROTHROMBIN TIME + INR    Collection Time: 03/26/18  1:30 AM   Result Value Ref Range    Prothrombin time 11.9 11.5 - 15.2 sec    INR 0.9 0.8 - 1.2     T4, FREE    Collection Time: 03/26/18  1:30 AM   Result Value Ref Range    T4, Free 1.7 (H) 0.7 - 1.5 NG/DL   HEMOGLOBIN A1C WITH EAG    Collection Time: 03/26/18  1:30 AM   Result Value Ref Range    Hemoglobin A1c 5.5 4.5 - 5.6 %    Est. average glucose 111 mg/dL   CARDIAC PANEL,(CK, CKMB & TROPONIN)    Collection Time: 03/26/18  5:38 AM   Result Value Ref Range     26 - 192 U/L    CK - MB 7.2 (H) <3.6 ng/ml    CK-MB Index 4.4 (H) 0.0 - 4.0 %    Troponin-I, Qt. 1.02 (HH) 0.00 - 8.71 NG/ML   METABOLIC PANEL, BASIC    Collection Time: 03/26/18  5:38 AM   Result Value Ref Range    Sodium 138 136 - 145 mmol/L    Potassium 3.5 3.5 - 5.5 mmol/L    Chloride 105 100 - 108 mmol/L    CO2 27 21 - 32 mmol/L    Anion gap 6 3.0 - 18 mmol/L    Glucose 107 (H) 74 - 99 mg/dL    BUN 9 7.0 - 18 MG/DL    Creatinine 0.73 0.6 - 1.3 MG/DL    BUN/Creatinine ratio 12 12 - 20      GFR est AA >60 >60 ml/min/1.73m2    GFR est non-AA >60 >60 ml/min/1.73m2    Calcium 8.6 8.5 - 10.1 MG/DL   HEPATIC FUNCTION PANEL    Collection Time: 03/26/18  5:38 AM   Result Value Ref Range    Protein, total 6.8 6.4 - 8.2 g/dL    Albumin 3.2 (L) 3.4 - 5.0 g/dL    Globulin 3.6 2.0 - 4.0 g/dL    A-G Ratio 0.9 0.8 - 1.7      Bilirubin, total 0.3 0.2 - 1.0 MG/DL    Bilirubin, direct 0.1 0.0 - 0.2 MG/DL    Alk.  phosphatase 124 (H) 45 - 117 U/L    AST (SGOT) 44 (H) 15 - 37 U/L    ALT (SGPT) 105 (H) 13 - 56 U/L   CBC W/O DIFF    Collection Time: 03/26/18  5:38 AM   Result Value Ref Range    WBC 9.3 4.6 - 13.2 K/uL    RBC 4.66 4.20 - 5.30 M/uL    HGB 15.0 12.0 - 16.0 g/dL    HCT 44.3 35.0 - 45.0 %    MCV 95.1 74.0 - 97.0 FL    MCH 32.2 24.0 - 34.0 PG    MCHC 33.9 31.0 - 37.0 g/dL    RDW 12.6 11.6 - 14.5 %    PLATELET 270 999 - 178 K/uL    MPV 9.7 9.2 - 11.8 FL   CARDIAC PANEL,(CK, CKMB & TROPONIN)    Collection Time: 03/26/18 12:44 PM   Result Value Ref Range     (H) 26 - 192 U/L    CK - MB 9.9 (H) <3.6 ng/ml    CK-MB Index 5.1 (H) 0.0 - 4.0 %    Troponin-I, Qt. 1.44 (HH) 0.00 - 0.06 NG/ML   EKG, 12 LEAD, SUBSEQUENT    Collection Time: 03/26/18  1:38 PM   Result Value Ref Range    Ventricular Rate 63 BPM    Atrial Rate 63 BPM    P-R Interval 134 ms    QRS Duration 82 ms    Q-T Interval 434 ms    QTC Calculation (Bezet) 444 ms    Calculated P Axis 79 degrees    Calculated R Axis 73 degrees    Calculated T Axis 47 degrees    Diagnosis       Normal sinus rhythm with sinus arrhythmia  Normal ECG  When compared with ECG of 26-MAR-2018 00:56,  No significant change was found     CARDIAC PANEL,(CK, CKMB & TROPONIN)    Collection Time: 03/26/18  4:50 PM   Result Value Ref Range     26 - 192 U/L    CK - MB 8.7 (H) <3.6 ng/ml    CK-MB Index 4.6 (H) 0.0 - 4.0 %    Troponin-I, Qt. 1.14 (HH) 0.00 - 0.06 NG/ML           Zechariah Ortiz MD

## 2018-03-26 NOTE — H&P
History & Physical    Patient: Miryam Cannon MRN: [id number]  CSN: [id number]    YOB: 1960  Age: 61 y.o. Sex: female      DOA: 3/26/2018    Chief Complaint:   Chief Complaint   Patient presents with    Chest Pain          HPI:     Miryam Cannon is a 61 y.o.  female who has HTN, DM , hyperlipedemia, CAD  Presents with chest pain that felt like a elephant was sitting on her chest radiating to jaw  Relieved by aspirin and Nitro  Started while standing and working at [employer]  Similar pain ten years ago Catherization reveled medical management   CV continues to smoke, DM HTN hyperliped  In ER troponin . 26 EKG no acute ST elevation   Felt better and wanted to go home convinced to stay  CV risk factors Smoker, DM HTN, FH  Past Medical History:   Diagnosis Date    Diabetes ([address]     Endocrine disease     Hypertension        Past Surgical History:   Procedure Laterality Date    HX GYN      HX ORTHOPAEDIC         History reviewed. No pertinent family history. Social History     Social History    Marital status:      Spouse name: N/A    Number of children: N/A    Years of education: N/A     Social History Main Topics    Smoking status: Current Every Day Smoker     Packs/day: 1.00    Smokeless tobacco: Never Used    Alcohol use No    Drug use: None    Sexual activity: Not Asked     Other Topics Concern    None     Social History Narrative    None       Prior to Admission medications    Medication Sig Start Date End Date Taking? Authorizing Provider   levothyroxine (SYNTHROID) 125 mcg tablet Take 125 mcg by mouth Daily (before breakfast). Yes Song Addison MD   nitroglycerin (NITROSTAT) 0.4 mg SL tablet by SubLINGual route every five (5) minutes as needed for Chest Pain. Yes Song Addison MD   aspirin (ASPIRIN) 325 mg tablet Take 325 mg by mouth daily.    Yes Song Addison MD       No Known Allergies      Review of Systems  GENERAL: Patient alert, awake and oriented times 3, able to communicate full sentences and not in distress. HEENT: No change in vision, no earache, tinnitus, sore throat or sinus congestion. NECK: No pain or stiffness. PULMONARY: No shortness of breath, cough or wheeze. Cardiovascular: no pnd or orthopnea, +CP  GASTROINTESTINAL: No abdominal pain, nausea, vomiting or diarrhea, melena or bright red blood per rectum. GENITOURINARY: No urinary frequency, urgency, hesitancy or dysuria. MUSCULOSKELETAL: No joint or muscle pain, no back pain, no recent trauma. Chronic leg pain hx of DVT right leg in past   DERMATOLOGIC: No rash, no itching, no lesions. ENDOCRINE: No polyuria, polydipsia, no heat or cold intolerance. No recent change in weight. HEMATOLOGICAL: No anemia or easy bruising or bleeding. NEUROLOGIC: No headache, seizures, numbness, tingling or weakness. Physical Exam:     Physical Exam:  Visit Vitals    /86 (BP 1 Location: Left arm, BP Patient Position: At rest;Supine)    Pulse 67    Temp 98.4 °F (36.9 °C)    Resp 15    Ht 5' 5\" (1.651 m)    Wt 81.6 kg (180 lb)    SpO2 98%    BMI 29.95 kg/m2      O2 Device: Room air    Temp (24hrs), Av.2 °F (36.8 °C), Min:97.9 °F (36.6 °C), Max:98.4 °F (36.9 °C)             General:  Alert, cooperative, no distress, appears stated age. Head: Normocephalic, without obvious abnormality, atraumatic. Eyes:  Conjunctivae/corneas clear. PERRL, EOMs intact. Nose: Nares normal. No drainage or sinus tenderness. Neck: Supple, symmetrical, trachea midline, no adenopathy, thyroid: no enlargement, no carotid bruit and no JVD. Lungs:   Clear to auscultation bilaterally. Heart:  Regular rate and rhythm, S1, S2 normal.     Abdomen: Soft, non-tender. Bowel sounds normal.    Extremities: Extremities normal, atraumatic, no cyanosis or edema. Pulses: 2+ and symmetric all extremities. Skin:  No rashes or lesions   Neurologic: AAOx3, No focal motor or sensory deficit. Labs Reviewed: All lab results for the last 24 hours reviewed. and EKG    Procedures/imaging: see electronic medical records for all procedures/Xrays and details which were not copied into this note but were reviewed prior to creation of Plan    Recent Results (from the past 24 hour(s))   EKG, 12 LEAD, INITIAL    Collection Time: 03/26/18 12:56 AM   Result Value Ref Range    Ventricular Rate 76 BPM    Atrial Rate 76 BPM    P-R Interval 138 ms    QRS Duration 84 ms    Q-T Interval 402 ms    QTC Calculation (Bezet) 452 ms    Calculated P Axis 61 degrees    Calculated R Axis 23 degrees    Calculated T Axis 47 degrees    Diagnosis       Normal sinus rhythm  Possible Left atrial enlargement  Borderline ECG  When compared with ECG of 28-FEB-2010 17:13,  No significant change was found     METABOLIC PANEL, BASIC    Collection Time: 03/26/18  1:30 AM   Result Value Ref Range    Sodium 147 (H) 136 - 145 mmol/L    Potassium 3.8 3.5 - 5.5 mmol/L    Chloride 108 100 - 108 mmol/L    CO2 30 21 - 32 mmol/L    Anion gap 9 3.0 - 18 mmol/L    Glucose 136 (H) 74 - 99 mg/dL    BUN 11 7.0 - 18 MG/DL    Creatinine 0.79 0.6 - 1.3 MG/DL    BUN/Creatinine ratio 14 12 - 20      GFR est AA >60 >60 ml/min/1.73m2    GFR est non-AA >60 >60 ml/min/1.73m2    Calcium 8.6 8.5 - 10.1 MG/DL   CBC WITH AUTOMATED DIFF    Collection Time: 03/26/18  1:30 AM   Result Value Ref Range    WBC 10.9 4.6 - 13.2 K/uL    RBC 4.76 4.20 - 5.30 M/uL    HGB 15.4 12.0 - 16.0 g/dL    HCT 45.4 (H) 35.0 - 45.0 %    MCV 95.4 74.0 - 97.0 FL    MCH 32.4 24.0 - 34.0 PG    MCHC 33.9 31.0 - 37.0 g/dL    RDW 12.5 11.6 - 14.5 %    PLATELET 572 082 - 394 K/uL    MPV 9.5 9.2 - 11.8 FL    NEUTROPHILS 78 (H) 40 - 73 %    LYMPHOCYTES 15 (L) 21 - 52 %    MONOCYTES 5 3 - 10 %    EOSINOPHILS 2 0 - 5 %    BASOPHILS 0 0 - 2 %    ABS. NEUTROPHILS 8.5 (H) 1.8 - 8.0 K/UL    ABS. LYMPHOCYTES 1.7 0.9 - 3.6 K/UL    ABS. MONOCYTES 0.6 0.05 - 1.2 K/UL    ABS.  EOSINOPHILS 0.2 0.0 - 0.4 K/UL    ABS. BASOPHILS 0.0 0.0 - 0.06 K/UL    DF AUTOMATED     CARDIAC PANEL,(CK, CKMB & TROPONIN)    Collection Time: 03/26/18  1:30 AM   Result Value Ref Range     26 - 192 U/L    CK - MB 3.4 <3.6 ng/ml    CK-MB Index 2.3 0.0 - 4.0 %    Troponin-I, Qt. 0.28 (H) 0.00 - 0.06 NG/ML     Assessment/Plan     Principal Problem:    Non-STEMI (non-ST elevated myocardial infarction) ([facility] (3/26/2018)    Active Problems:    CAD (coronary artery disease), native coronary artery (3/26/2018)      Acute chest pain (3/26/2018)  Will check enzymes echo  Started on Coreg and Lipitor  Patient did not know home meds can reconsile   Once admitted   Cardiology possible cath     DM  Sliding scale insulin     Hypothyroidism  Cont synthroid   Check TSH    Tobacco Abuse    Advised to quit  Does not want patch     Hx of DVT   Will check ultrasound  Add D dimer     DVT/GI Prophylaxis: Lovenox and Hep SQ    Discussed with patient at bedside about hospital admission and my plan care, who understood and agree with my plan care.     Gee Schulte MD  3/26/2018 4:32 AM

## 2018-03-27 LAB
ANION GAP SERPL CALC-SCNC: 8 MMOL/L (ref 3–18)
BUN SERPL-MCNC: 9 MG/DL (ref 7–18)
BUN/CREAT SERPL: 12 (ref 12–20)
CALCIUM SERPL-MCNC: 8 MG/DL (ref 8.5–10.1)
CHLORIDE SERPL-SCNC: 109 MMOL/L (ref 100–108)
CHOLEST SERPL-MCNC: 151 MG/DL
CHOLEST SERPL-MCNC: 156 MG/DL
CO2 SERPL-SCNC: 27 MMOL/L (ref 21–32)
CREAT SERPL-MCNC: 0.73 MG/DL (ref 0.6–1.3)
ERYTHROCYTE [DISTWIDTH] IN BLOOD BY AUTOMATED COUNT: 12.5 % (ref 11.6–14.5)
GLUCOSE BLD STRIP.AUTO-MCNC: 86 MG/DL (ref 70–110)
GLUCOSE SERPL-MCNC: 93 MG/DL (ref 74–99)
HCT VFR BLD AUTO: 42 % (ref 35–45)
HCT VFR BLD AUTO: 43.5 % (ref 35–45)
HDLC SERPL-MCNC: 41 MG/DL (ref 40–60)
HDLC SERPL-MCNC: 41 MG/DL (ref 40–60)
HDLC SERPL: 3.7 {RATIO} (ref 0–5)
HDLC SERPL: 3.8 {RATIO} (ref 0–5)
HGB BLD-MCNC: 14.5 G/DL (ref 12–16)
HGB BLD-MCNC: 15.1 G/DL (ref 12–16)
LDLC SERPL CALC-MCNC: 103.4 MG/DL (ref 0–100)
LDLC SERPL CALC-MCNC: 82.6 MG/DL (ref 0–100)
LIPID PROFILE,FLP: ABNORMAL
LIPID PROFILE,FLP: NORMAL
MCH RBC QN AUTO: 32.8 PG (ref 24–34)
MCHC RBC AUTO-ENTMCNC: 34.7 G/DL (ref 31–37)
MCV RBC AUTO: 94.6 FL (ref 74–97)
PLATELET # BLD AUTO: 184 K/UL (ref 135–420)
PMV BLD AUTO: 9.5 FL (ref 9.2–11.8)
POTASSIUM SERPL-SCNC: 3.7 MMOL/L (ref 3.5–5.5)
RBC # BLD AUTO: 4.6 M/UL (ref 4.2–5.3)
SODIUM SERPL-SCNC: 144 MMOL/L (ref 136–145)
TRIGL SERPL-MCNC: 137 MG/DL (ref ?–150)
TRIGL SERPL-MCNC: 58 MG/DL (ref ?–150)
VLDLC SERPL CALC-MCNC: 11.6 MG/DL
VLDLC SERPL CALC-MCNC: 27.4 MG/DL
WBC # BLD AUTO: 7.4 K/UL (ref 4.6–13.2)

## 2018-03-27 PROCEDURE — 36415 COLL VENOUS BLD VENIPUNCTURE: CPT | Performed by: INTERNAL MEDICINE

## 2018-03-27 PROCEDURE — 74011000250 HC RX REV CODE- 250

## 2018-03-27 PROCEDURE — 027034Z DILATION OF CORONARY ARTERY, ONE ARTERY WITH DRUG-ELUTING INTRALUMINAL DEVICE, PERCUTANEOUS APPROACH: ICD-10-PCS | Performed by: INTERNAL MEDICINE

## 2018-03-27 PROCEDURE — 74011636320 HC RX REV CODE- 636/320: Performed by: INTERNAL MEDICINE

## 2018-03-27 PROCEDURE — 74011250636 HC RX REV CODE- 250/636: Performed by: INTERNAL MEDICINE

## 2018-03-27 PROCEDURE — 92979 ENDOLUMINL IVUS OCT C EA: CPT

## 2018-03-27 PROCEDURE — B240ZZ3 ULTRASONOGRAPHY OF SINGLE CORONARY ARTERY, INTRAVASCULAR: ICD-10-PCS | Performed by: INTERNAL MEDICINE

## 2018-03-27 PROCEDURE — 74011250637 HC RX REV CODE- 250/637

## 2018-03-27 PROCEDURE — 80061 LIPID PANEL: CPT | Performed by: INTERNAL MEDICINE

## 2018-03-27 PROCEDURE — B2151ZZ FLUOROSCOPY OF LEFT HEART USING LOW OSMOLAR CONTRAST: ICD-10-PCS | Performed by: INTERNAL MEDICINE

## 2018-03-27 PROCEDURE — 85018 HEMOGLOBIN: CPT | Performed by: INTERNAL MEDICINE

## 2018-03-27 PROCEDURE — 74011000250 HC RX REV CODE- 250: Performed by: INTERNAL MEDICINE

## 2018-03-27 PROCEDURE — 65660000000 HC RM CCU STEPDOWN

## 2018-03-27 PROCEDURE — 74011250637 HC RX REV CODE- 250/637: Performed by: INTERNAL MEDICINE

## 2018-03-27 PROCEDURE — 4A023N7 MEASUREMENT OF CARDIAC SAMPLING AND PRESSURE, LEFT HEART, PERCUTANEOUS APPROACH: ICD-10-PCS | Performed by: INTERNAL MEDICINE

## 2018-03-27 PROCEDURE — 80048 BASIC METABOLIC PNL TOTAL CA: CPT | Performed by: INTERNAL MEDICINE

## 2018-03-27 PROCEDURE — 74011250636 HC RX REV CODE- 250/636

## 2018-03-27 PROCEDURE — B2111ZZ FLUOROSCOPY OF MULTIPLE CORONARY ARTERIES USING LOW OSMOLAR CONTRAST: ICD-10-PCS | Performed by: INTERNAL MEDICINE

## 2018-03-27 PROCEDURE — 85027 COMPLETE CBC AUTOMATED: CPT | Performed by: INTERNAL MEDICINE

## 2018-03-27 PROCEDURE — 74011000258 HC RX REV CODE- 258: Performed by: INTERNAL MEDICINE

## 2018-03-27 PROCEDURE — 82962 GLUCOSE BLOOD TEST: CPT

## 2018-03-27 PROCEDURE — 3E033PZ INTRODUCTION OF PLATELET INHIBITOR INTO PERIPHERAL VEIN, PERCUTANEOUS APPROACH: ICD-10-PCS | Performed by: INTERNAL MEDICINE

## 2018-03-27 RX ORDER — HEPARIN SODIUM 200 [USP'U]/100ML
INJECTION, SOLUTION INTRAVENOUS
Status: DISPENSED
Start: 2018-03-27 | End: 2018-03-28

## 2018-03-27 RX ORDER — SODIUM CHLORIDE 900 MG/100ML
INJECTION INTRAVENOUS
Status: DISPENSED
Start: 2018-03-27 | End: 2018-03-28

## 2018-03-27 RX ORDER — FENTANYL CITRATE 50 UG/ML
INJECTION, SOLUTION INTRAMUSCULAR; INTRAVENOUS
Status: COMPLETED
Start: 2018-03-27 | End: 2018-03-27

## 2018-03-27 RX ORDER — LIDOCAINE HYDROCHLORIDE 10 MG/ML
INJECTION, SOLUTION EPIDURAL; INFILTRATION; INTRACAUDAL; PERINEURAL
Status: DISPENSED
Start: 2018-03-27 | End: 2018-03-28

## 2018-03-27 RX ORDER — HEPARIN SODIUM 1000 [USP'U]/ML
INJECTION, SOLUTION INTRAVENOUS; SUBCUTANEOUS
Status: COMPLETED
Start: 2018-03-27 | End: 2018-03-27

## 2018-03-27 RX ORDER — HEPARIN SODIUM 200 [USP'U]/100ML
500 INJECTION, SOLUTION INTRAVENOUS
Status: DISCONTINUED | OUTPATIENT
Start: 2018-03-27 | End: 2018-03-27 | Stop reason: HOSPADM

## 2018-03-27 RX ORDER — SODIUM CHLORIDE 0.9 % (FLUSH) 0.9 %
5-10 SYRINGE (ML) INJECTION AS NEEDED
Status: DISCONTINUED | OUTPATIENT
Start: 2018-03-27 | End: 2018-03-28 | Stop reason: HOSPADM

## 2018-03-27 RX ORDER — LIDOCAINE HYDROCHLORIDE 10 MG/ML
5 INJECTION, SOLUTION EPIDURAL; INFILTRATION; INTRACAUDAL; PERINEURAL ONCE
Status: COMPLETED | OUTPATIENT
Start: 2018-03-27 | End: 2018-03-27

## 2018-03-27 RX ORDER — FENTANYL CITRATE 50 UG/ML
25-100 INJECTION, SOLUTION INTRAMUSCULAR; INTRAVENOUS
Status: DISCONTINUED | OUTPATIENT
Start: 2018-03-27 | End: 2018-03-27 | Stop reason: HOSPADM

## 2018-03-27 RX ORDER — MIDAZOLAM HYDROCHLORIDE 1 MG/ML
.5-2 INJECTION, SOLUTION INTRAMUSCULAR; INTRAVENOUS
Status: DISCONTINUED | OUTPATIENT
Start: 2018-03-27 | End: 2018-03-27 | Stop reason: HOSPADM

## 2018-03-27 RX ORDER — SODIUM CHLORIDE 0.9 % (FLUSH) 0.9 %
5-10 SYRINGE (ML) INJECTION EVERY 8 HOURS
Status: DISCONTINUED | OUTPATIENT
Start: 2018-03-27 | End: 2018-03-28 | Stop reason: HOSPADM

## 2018-03-27 RX ORDER — ATROPINE SULFATE 0.1 MG/ML
0.5 INJECTION INTRAVENOUS AS NEEDED
Status: DISCONTINUED | OUTPATIENT
Start: 2018-03-27 | End: 2018-03-28 | Stop reason: HOSPADM

## 2018-03-27 RX ORDER — VERAPAMIL HYDROCHLORIDE 2.5 MG/ML
INJECTION, SOLUTION INTRAVENOUS
Status: COMPLETED
Start: 2018-03-27 | End: 2018-03-27

## 2018-03-27 RX ORDER — INSULIN LISPRO 100 [IU]/ML
INJECTION, SOLUTION INTRAVENOUS; SUBCUTANEOUS
Status: DISCONTINUED | OUTPATIENT
Start: 2018-03-27 | End: 2018-03-28 | Stop reason: HOSPADM

## 2018-03-27 RX ORDER — LIDOCAINE HYDROCHLORIDE 10 MG/ML
INJECTION, SOLUTION EPIDURAL; INFILTRATION; INTRACAUDAL; PERINEURAL
Status: COMPLETED
Start: 2018-03-27 | End: 2018-03-27

## 2018-03-27 RX ORDER — MIDAZOLAM HYDROCHLORIDE 1 MG/ML
INJECTION, SOLUTION INTRAMUSCULAR; INTRAVENOUS
Status: COMPLETED
Start: 2018-03-27 | End: 2018-03-27

## 2018-03-27 RX ORDER — ENOXAPARIN SODIUM 100 MG/ML
1 INJECTION SUBCUTANEOUS EVERY 12 HOURS
Status: DISCONTINUED | OUTPATIENT
Start: 2018-03-27 | End: 2018-03-28 | Stop reason: HOSPADM

## 2018-03-27 RX ORDER — FENTANYL CITRATE 50 UG/ML
INJECTION, SOLUTION INTRAMUSCULAR; INTRAVENOUS
Status: DISPENSED
Start: 2018-03-27 | End: 2018-03-28

## 2018-03-27 RX ORDER — BIVALIRUDIN 250 MG/5ML
INJECTION, POWDER, LYOPHILIZED, FOR SOLUTION INTRAVENOUS
Status: DISPENSED
Start: 2018-03-27 | End: 2018-03-28

## 2018-03-27 RX ORDER — HEPARIN SODIUM 1000 [USP'U]/ML
1000-4000 INJECTION, SOLUTION INTRAVENOUS; SUBCUTANEOUS
Status: DISCONTINUED | OUTPATIENT
Start: 2018-03-27 | End: 2018-03-27 | Stop reason: HOSPADM

## 2018-03-27 RX ORDER — BIVALIRUDIN 250 MG/5ML
INJECTION, POWDER, LYOPHILIZED, FOR SOLUTION INTRAVENOUS
Status: COMPLETED
Start: 2018-03-27 | End: 2018-03-27

## 2018-03-27 RX ORDER — NITROGLYCERIN 400 UG/1
1 SPRAY ORAL
Status: DISCONTINUED | OUTPATIENT
Start: 2018-03-27 | End: 2018-03-28 | Stop reason: HOSPADM

## 2018-03-27 RX ORDER — GABAPENTIN 400 MG/1
1200 CAPSULE ORAL
Status: DISCONTINUED | OUTPATIENT
Start: 2018-03-27 | End: 2018-03-27

## 2018-03-27 RX ORDER — ACETAMINOPHEN 325 MG/1
650 TABLET ORAL
Status: DISCONTINUED | OUTPATIENT
Start: 2018-03-27 | End: 2018-03-28 | Stop reason: HOSPADM

## 2018-03-27 RX ORDER — HYDRALAZINE HYDROCHLORIDE 20 MG/ML
10 INJECTION INTRAMUSCULAR; INTRAVENOUS
Status: DISCONTINUED | OUTPATIENT
Start: 2018-03-27 | End: 2018-03-28 | Stop reason: HOSPADM

## 2018-03-27 RX ORDER — METOPROLOL TARTRATE 5 MG/5ML
5 INJECTION INTRAVENOUS
Status: COMPLETED | OUTPATIENT
Start: 2018-03-27 | End: 2018-03-27

## 2018-03-27 RX ADMIN — LEVOTHYROXINE SODIUM 125 MCG: 125 TABLET ORAL at 07:58

## 2018-03-27 RX ADMIN — Medication 10 ML: at 07:52

## 2018-03-27 RX ADMIN — TICAGRELOR 180 MG: 90 TABLET ORAL at 17:55

## 2018-03-27 RX ADMIN — BIVALIRUDIN 1.75 MG/KG/HR: 250 INJECTION, POWDER, LYOPHILIZED, FOR SOLUTION INTRAVENOUS at 17:28

## 2018-03-27 RX ADMIN — CARVEDILOL 6.25 MG: 3.12 TABLET, FILM COATED ORAL at 19:02

## 2018-03-27 RX ADMIN — FENTANYL CITRATE 100 MCG: 50 INJECTION, SOLUTION INTRAMUSCULAR; INTRAVENOUS at 17:29

## 2018-03-27 RX ADMIN — MIDAZOLAM HYDROCHLORIDE 2 MG: 1 INJECTION, SOLUTION INTRAMUSCULAR; INTRAVENOUS at 17:05

## 2018-03-27 RX ADMIN — HEPARIN SODIUM 4000 UNITS: 1000 INJECTION, SOLUTION INTRAVENOUS; SUBCUTANEOUS at 17:05

## 2018-03-27 RX ADMIN — VERAPAMIL HYDROCHLORIDE 3 ML: 2.5 INJECTION INTRAVENOUS at 17:05

## 2018-03-27 RX ADMIN — MIDAZOLAM HYDROCHLORIDE 2 MG: 1 INJECTION, SOLUTION INTRAMUSCULAR; INTRAVENOUS at 17:30

## 2018-03-27 RX ADMIN — CARVEDILOL 6.25 MG: 3.12 TABLET, FILM COATED ORAL at 09:03

## 2018-03-27 RX ADMIN — ACETAMINOPHEN 650 MG: 325 TABLET ORAL at 21:58

## 2018-03-27 RX ADMIN — HYDRALAZINE HYDROCHLORIDE 10 MG: 20 INJECTION INTRAMUSCULAR; INTRAVENOUS at 23:58

## 2018-03-27 RX ADMIN — LISINOPRIL 5 MG: 5 TABLET ORAL at 09:03

## 2018-03-27 RX ADMIN — LIDOCAINE HYDROCHLORIDE 3 ML: 10 INJECTION, SOLUTION EPIDURAL; INFILTRATION; INTRACAUDAL; PERINEURAL at 17:32

## 2018-03-27 RX ADMIN — SIMVASTATIN 20 MG: 20 TABLET, FILM COATED ORAL at 21:52

## 2018-03-27 RX ADMIN — BIVALIRUDIN 63 MG: 250 INJECTION, POWDER, LYOPHILIZED, FOR SOLUTION INTRAVENOUS at 17:28

## 2018-03-27 RX ADMIN — FENTANYL CITRATE 100 MCG: 50 INJECTION, SOLUTION INTRAMUSCULAR; INTRAVENOUS at 17:05

## 2018-03-27 RX ADMIN — FENTANYL CITRATE 100 MCG: 50 INJECTION, SOLUTION INTRAMUSCULAR; INTRAVENOUS at 17:33

## 2018-03-27 RX ADMIN — VERAPAMIL HYDROCHLORIDE 2.5 MG: 2.5 INJECTION INTRAVENOUS at 17:31

## 2018-03-27 RX ADMIN — METOPROLOL TARTRATE 5 MG: 5 INJECTION, SOLUTION INTRAVENOUS at 22:05

## 2018-03-27 RX ADMIN — IOPAMIDOL 110 ML: 612 INJECTION, SOLUTION INTRAVENOUS at 18:04

## 2018-03-27 RX ADMIN — SODIUM CHLORIDE 100 ML/HR: 900 INJECTION, SOLUTION INTRAVENOUS at 17:28

## 2018-03-27 RX ADMIN — BIVALIRUDIN 1.75 MG/KG/HR: 250 INJECTION, POWDER, LYOPHILIZED, FOR SOLUTION INTRAVENOUS at 17:58

## 2018-03-27 RX ADMIN — ASPIRIN 81 MG 81 MG: 81 TABLET ORAL at 09:03

## 2018-03-27 RX ADMIN — SODIUM CHLORIDE 100 ML/HR: 900 INJECTION, SOLUTION INTRAVENOUS at 07:52

## 2018-03-27 NOTE — ROUTINE PROCESS
Cardiac Cath Lab:  Pre Procedure Chart Check     Patients chart was accessed and reviewed for possible and/or scheduled procedure. Creatinine Clearance:  CREATININE: 0.73 MG/DL (03/26/18 0538)  Estimated creatinine clearance: 90 mL/min    Total Contrast  Load:  3 x estimated clearance amount=  270ml    75% of Contrast Load:  0.75 x Total Contrast Load=    202.5ml    Recent Labs      03/26/18   1650   03/26/18   0538  03/26/18   0130   WBC   --    --   9.3  10.9   RBC   --    --   4.66  4.76   HCT   --    --   44.3  45.4*   HGB   --    --   15.0  15.4   PLT   --    --   200  210   INR   --    --    --   0.9   PTP   --    --    --   11.9   NA   --    --   138  147*   K   --    --   3.5  3.8   BUN   --    --   9  11   CREA   --    --   0.73  0.79   GFRAA   --    --   >60  >60   GFRNA   --    --   >60  >60   CA   --    --   8.6  8.6   CPK  188   < >  164  149   CKMB  8.7*   < >  7.2*  3.4   CKND1  4.6*   < >  4.4*  2.3   TROIQ  1.14*   < >  1.02*  0.28*    < > = values in this interval not displayed. BMI: Body mass index is 30.93 kg/(m^2).     ALLERGIES: No Known Allergies    Lines:        Peripheral IV 03/26/18 Left Antecubital (Active)   Site Assessment Clean, dry, & intact 3/27/2018  8:45 AM   Phlebitis Assessment 0 3/27/2018  8:45 AM   Infiltration Assessment 0 3/27/2018  8:45 AM   Dressing Status Clean, dry, & intact 3/27/2018  8:45 AM   Dressing Type Tape;Transparent 3/27/2018  8:45 AM   Hub Color/Line Status Pink;Flushed;Capped 3/27/2018  8:45 AM   Action Taken Open ports on tubing capped 3/27/2018  8:45 AM   Alcohol Cap Used Yes 3/27/2018  8:45 AM          History:    Past Medical History:   Diagnosis Date    Diabetes ([address]     Endocrine disease     Hypertension      Past Surgical History:   Procedure Laterality Date    HX GYN      HX ORTHOPAEDIC       Patient Active Problem List   Diagnosis Code    CAD (coronary artery disease), native coronary artery I25.10    Acute chest pain R07.9    Non-STEMI (non-ST elevated myocardial infarction) ([facility] I21.4    Chest pain R07.9    Diabetes mellitus type 2, controlled ([facility] E11.9

## 2018-03-27 NOTE — PROCEDURES
Brief Cardiac Catheterization Procedure Note    Patient: Miryam Cannon MRN: [id number]  SSN: [id number]    YOB: 1960  Age: 61 y.o. Sex: female      Date of Procedure: 3/27/2018     Pre-procedure Diagnosis:  NSTEMI    Post-procedure Diagnosis: Same    Procedure: Left Heart Catheterization with Percutaneous Coronary Intervention    Performed By: Abril Solano MD     Anesthesia: Moderate Sedation    Estimated Blood Loss: Less than 10 mL      Specimens: None    Findings: Patent LCA. 99% mid RCA. Total occlusion of Proximal PDA with collateral to distal PDA from LCA. Complications: None    Implants: 3.5mm X 22 mm Resolute Intrigity to mid RCA. Recommendations: Continue medical therapy.     Signed By: Abril Solano MD     March 27, 2018

## 2018-03-27 NOTE — H&P
Date of Surgery Update:  Lindsey Sifuentes was seen and examined. History and physical has been reviewed. The patient has been examined. There have been no significant clinical changes since the completion of the originally dated History and Physical. Plan cath with moderate sedation.     Signed By: Gavi Orantes MD     March 27, 2018 4:56 PM

## 2018-03-27 NOTE — PROCEDURES
Vipul Alvarado  MR#: [id number]  : 1960  ACCOUNT #: [de-identified]   DATE OF SERVICE: 2018    PROCEDURES PERFORMED:  Cardiac catheterization, coronary angiogram and percutaneous coronary intervention, conscious sedation 18616 and 15229. PREOPERATIVE DIAGNOSIS:  Non-ST elevation myocardial infarction. POSTOPERATIVE DIAGNOSIS:  Non-ST elevation myocardial infarction. BRIEF CLINICAL HISTORY:  The patient is a 67-year-old lady who was admitted for chest pain. Her chest pain resolved with some aspirin and nitro. She had elevations of her troponin. She was diagnosed as a non-STEMI. In this setting, it was felt that she should undergo catheterization and angiography, etc.    SPECIMENS REMOVED:  None. ESTIMATED BLOOD LOSS:  Minimal.    INDICATIONS:  The patient is a 67-year-old lady who presented with chest pain. She has a history of a prior catheterization several years ago, about 10 years ago, which showed a distal lesion somewhere that they did not do any intervention on or could do an intervention. At any rate, she presented at this time with chest pain. It improved with nitro and aspirin. Her troponins were elevated. In this setting, it was felt that she should undergo angiography and cardiac catheterization. TECHNIQUE:  The patient was brought to the catheterization laboratory and placed on the table in usual fashion. Right wrist was prepped and draped for radial access. After lidocaine local anesthesia, a 6-Danish sheath was placed in the right radial artery using a micropuncture technique. Next, the patient received a spasmolytic cocktail of verapamil and nitroglycerin through the sheath. Next, a multipurpose catheter was introduced and coronary angiograms were performed. The multipurpose catheter was manipulated in the left ventricle. Pressures were recorded. Hand injection left ventriculogram was performed.   Pullback from left ventricle to the aorta was obtained. A severe mid right coronary stenosis of 99% was identified. This was indeed felt to be the culprit lesion. We then removed the multipurpose catheter and introduced a JR4 guide. I used a BMW guidewire to wire the right coronary artery and initially dilated it with a 2.5 x 15 mm balloon. We then introduced the IVUS catheter, and IVUS was performed for sizing. We selected a 3.5 x 22 mm Resolute Integrity stent, which was positioned and then deployed. Post deployment, we introduced an 8 x 3.5 mm noncompliant balloon to treat the couple of areas in the stent that appeared not to be fully deployed initially. After the noncompliant balloon treatment, the patient's stent was fully deployed. That balloon was removed. Post-PCI angiograms were performed. There was a widely patent stent in site with no residual stenosis and no dissection. Prior to the PCI, there was WENDY grade II flow that increased WENDY grade III flow subsequently. The patient had a good myocardial blush. The guidewire and guide catheter were then removed. The radial sheath was removed, and a TR band was applied. The patient was then returned to recovery in satisfactory condition having tolerated the procedure well. There were no complications. RESULTS:  Hemodynamics: There was no gradient on pullback across the aortic valve. Left ventricular end diastolic pressure was normal.      ANGIOGRAPHY:  Left ventriculogram:  Hand injection left ventriculogram was filmed in a 30 degree RAMIREZ projection. Contractility appeared normal throughout all segments of the left ventricle. Ejection fraction was estimated to be 60%. LEFT CORONARY ARTERY:  Left main coronary artery was normal.  The left anterior descending and its branches appear normal.  The circumflex vessel appears normal.    RIGHT CORONARY ARTERY:  The right coronary artery is a fairly large vessel.   There is a 99% stenosis of the mid right coronary artery, followed by total occlusion of the PDA about 1/3 down the posterior interventricular septum. Of note is the fact that the distal PDA fills from left coronary artery injections. POST-STENT DEPLOYMENT ANGIOGRAMS:  Post-stent deployment angiograms revealed widely patent stent at site with no residual stenosis and no dissection. CONCLUSION:  Satisfactory PCI with stent deployment to mid right coronary artery with no evidence of dissection and with WENDY grade III flow post intervention. PLAN:  Continue medical therapy and followup.       MD Jerilyn Cardozo / [place]  D: 03/27/2018 18:36     T: 03/27/2018 19:42  JOB #: [id number]

## 2018-03-27 NOTE — PROGRESS NOTES
Hospitalist Progress Note    Patient: Kalyn Coates MRN: [id number]  CSN: [id number]    YOB: 1960  Age: 61 y.o. Sex: female    DOA: 3/26/2018 LOS:  LOS: 1 day          Chief Complaint:    Chest Pain    Assessment/Plan     1. NSTEMI  2. CAD  3. DM  4. Hypothyroidism  5. Tobacco Use  6. Hx of DVT    1. Patient to undergo cardiac catheterization. Is not pleased having to be NPO prior to procedure, explained reasoning to the patient. Dr Fang Brownlee on board. Will await results of cath. 2. As above, cath today, await results and further recommendations from cardiology. 3. Continue SSI. 4. Continue Synthroid. 5. Does not want nicotine patch, wants to be discharged to have cigarette. Advised to quit smoking given cardiac history. 6. Therapeutic lovenox dosing. DVT Prophylaxis - Lovenox  Dispo: awaiting cardiac cath results and further recommendations. Patient Active Problem List   Diagnosis Code    CAD (coronary artery disease), native coronary artery I25.10    Acute chest pain R07.9    Non-STEMI (non-ST elevated myocardial infarction) ([facility] [address] I21.4    Chest pain R07.9    Diabetes mellitus type 2, controlled ([facility] [address] [facility] E11.9       Subjective:    No further CP. Not pleased with NPO status.     Review of systems:    Constitutional: denies fevers, chills, myalgias  Respiratory: denies SOB, cough  Cardiovascular: denies chest pain, palpitations  Gastrointestinal: denies nausea, vomiting, diarrhea      Vital signs/Intake and Output:  Visit Vitals    /75 (BP 1 Location: Right arm, BP Patient Position: At rest;Lying right side)    Pulse (!) 59    Temp 98.2 °F (36.8 °C)    Resp 14    Ht 5' 5\" (1.651 m)    Wt 84.3 kg (185 lb 13.6 oz)    SpO2 100%    BMI 30.93 kg/m2     Current Shift:  03/27 0701 - 03/27 1900  In: 0   Out: 650 [Urine:650]  Last three shifts:  03/25 1901 - 03/27 0700  In: 1560 [P.O.:1560]  Out: 1700 [Urine:1700]    Exam:    General: Well developed, alert, NAD, OX3  Head/Neck: NCAT, supple, No masses, No lymphadenopathy  CVS:Regular rate and rhythm, no M/R/G, S1/S2 heard, no thrill  Lungs:Clear to auscultation bilaterally, no wheezes, rhonchi, or rales  Abdomen: Soft, Nontender, No distention, Normal Bowel sounds, No hepatomegaly  Extremities: No C/C/E, pulses palpable 2+  Skin:normal texture and turgor, no rashes, no lesions  Neuro:grossly normal , follows commands  Psych:appropriate                Labs: Results:       Chemistry Recent Labs      03/26/18   0538  03/26/18   0130   GLU  107*  136*   NA  138  147*   K  3.5  3.8   CL  105  108   CO2  27  30   BUN  9  11   CREA  0.73  0.79   CA  8.6  8.6   AGAP  6  9   BUCR  12  14   AP  124*   --    TP  6.8   --    ALB  3.2*   --    GLOB  3.6   --    AGRAT  0.9   --       CBC w/Diff Recent Labs      03/26/18   0538  03/26/18   0130   WBC  9.3  10.9   RBC  4.66  4.76   HGB  15.0  15.4   HCT  44.3  45.4*   PLT  200  210   GRANS   --   78*   LYMPH   --   15*   EOS   --   2      Cardiac Enzymes Recent Labs      03/26/18   1650  03/26/18   1244   CPK  188  193*   CKND1  4.6*  5.1*      Coagulation Recent Labs      03/26/18   0130   PTP  11.9   INR  0.9       Lipid Panel Lab Results   Component Value Date/Time    Cholesterol, total 151 03/27/2018 06:03 AM    HDL Cholesterol 41 03/27/2018 06:03 AM    LDL, calculated 82.6 03/27/2018 06:03 AM    VLDL, calculated 27.4 03/27/2018 06:03 AM    Triglyceride 137 03/27/2018 06:03 AM    CHOL/HDL Ratio 3.7 03/27/2018 06:03 AM      BNP No results for input(s): BNPP in the last 72 hours.    Liver Enzymes Recent Labs      03/26/18   0538   TP  6.8   ALB  3.2*   AP  124*   SGOT  44*      Thyroid Studies Lab Results   Component Value Date/Time    TSH 0.25 (L) 03/26/2018 01:30 AM        Procedures/imaging: see electronic medical records for all procedures/Xrays and details which were not copied into this note but were reviewed prior to creation of [address]

## 2018-03-27 NOTE — PROGRESS NOTES
Pt returned to care unit aaox3, iv infusing without incident, tr band intact to right wrist, neuro/vasc assessment of right wrist and arm WNL

## 2018-03-27 NOTE — ROUTINE PROCESS
TRANSFER - OUT REPORT:  Verbal report given to 36 Andie Márquez RN(name) on [employer] being transferred to CARE(unit) for routine progression of care   Report consisted of patients Situation, Background, Assessment and   Recommendations(SBAR). Information from the following report(s) Procedure Summary, MAR, Recent Results, Med Rec Status and Cardiac Rhythm NSR was reviewed with the receiving nurse.     Cath Lab Report:    Procedure:  [X] LHC  [ ] RHC  FilomenaJesus ] PTCA   [ ] Peripheral   [ ] Pacemaker [ ] SOLANGE  [ ] [address]    Access site:   FilomenaJesus ] Right  FilomenaJesus ] Radial     Sheath:           FilomenaJesus ] Pulled in Cath Lab          Closure:          Gemma.Jesus ] Radial Band  [ ] Manual Pressure     [ ] Angio Seal     [ ] Star Close    [ ] Per Close    [ ] Safe Guard    Site Assessment:   FilomenaJesus ] Clean, Dry, No bleeding    [ ] Minor oozing          [ ] Hematoma: Description:    Stents(s) Placement:  [ ] Left Main:                 [ ] LAD:                [ ] Circ:                FilomenaJesus ] RCA: ALLYSSA MID                   [ ] Peripheral:      [ ] N/A    Infusion [X ]Angiomax    Intra procedure Medications:    Fentanyl:300 MCG  Versed:3 MG  Brilinta 180 mg    Lines:        Peripheral IV 03/26/18 Left Antecubital (Active)   Site Assessment Clean, dry, & intact 3/27/2018  8:45 AM   Phlebitis Assessment 0 3/27/2018  8:45 AM   Infiltration Assessment 0 3/27/2018  8:45 AM   Dressing Status Clean, dry, & intact 3/27/2018  8:45 AM   Dressing Type Tape;Transparent 3/27/2018  8:45 AM   Hub Color/Line Status Pink;Flushed;Capped 3/27/2018  8:45 AM   Action Taken Open ports on tubing capped 3/27/2018  8:45 AM   Alcohol Cap Used Yes 3/27/2018  8:45 AM          Patient Vitals for the past 4 hrs:   Temp Pulse Resp BP SpO2   03/27/18 1759 98.1 °F (36.7 °C) (!) 56 16 168/86 100 %   03/27/18 1546 98.8 °F (37.1 °C) (!) 57 16 141/70 96 %   03/27/18 1500 98.2 °F (36.8 °C) (!) 59 14 159/75 100 %         Extended / Orthostatic Vitals:    Vital Signs  Level of Consciousness: Alert (03/27/18 1759)  Temp: 98.1 °F (36.7 °C) (03/27/18 1759)  Temp Source: Oral (03/27/18 1759)  Pulse (Heart Rate): (!) 56 (03/27/18 1759)  Heart Rate Source: Monitor (03/27/18 1759)  Cardiac Rhythm: Normal sinus rhythm (03/27/18 1759)  Resp Rate: 16 (03/27/18 1759)  BP: 168/86 (03/27/18 1759)  MAP (Monitor): 103 (03/26/18 0245)  MAP (Calculated): 113 (03/27/18 1759)  BP 1 Location: Left arm (03/27/18 1759)  BP 1 Method: Automatic (03/27/18 1759)  BP Patient Position: At rest;Supine (03/27/18 1759)  MEWS Score: 1 (03/27/18 1759)         Oxygen Therapy  O2 Sat (%): 100 % (03/27/18 1759)  O2 Device: Room air (03/27/18 1546)          Opportunity for questions and clarification was provided.

## 2018-03-27 NOTE — PROGRESS NOTES
D/c plan: anticipate d/c tomorrow  Met with patient during IDR's patient is NPO for cath. Plan after cath d/c home may need [facility].   Cm will continue to follow  Care Management Interventions  PCP Verified by CM:  Yes  Transition of Care Consult (CM Consult): Discharge Planning

## 2018-03-27 NOTE — PROGRESS NOTES
1935: Assumed patient care, she was alert and oriented to person, place, time and situation. Respiratory status was stable on room air. Vital signs were stable. MEWS score was a one. Patient denied any nausea vomiting dizziness or anxiety. White board was updated and explained. Bed was locked and in lowest position. Call bell, water and personal belongings were within reach. Patient had no questions, comments or concerns after bedside shift report. 0000: Patient verbalized understanding that she was NPO for a possible procedure in the morning.    0700: Patient had an uneventful shift. Respiratory status, vital signs and MEWS score remained stable. Patient was resting quietly with no signs of distress noted. Bed locked and in lowest position. Call bell water and personal belongings were within reach. Patient had no questions, comments or concerns after bedside shift report.  Bedside report given to [address]

## 2018-03-28 VITALS
SYSTOLIC BLOOD PRESSURE: 137 MMHG | BODY MASS INDEX: 31.15 KG/M2 | HEIGHT: 65 IN | TEMPERATURE: 98.1 F | OXYGEN SATURATION: 94 % | WEIGHT: 186.95 LBS | HEART RATE: 77 BPM | DIASTOLIC BLOOD PRESSURE: 84 MMHG | RESPIRATION RATE: 16 BRPM

## 2018-03-28 LAB
ANION GAP SERPL CALC-SCNC: 9 MMOL/L (ref 3–18)
BUN SERPL-MCNC: 8 MG/DL (ref 7–18)
BUN/CREAT SERPL: 15 (ref 12–20)
CALCIUM SERPL-MCNC: 8.5 MG/DL (ref 8.5–10.1)
CHLORIDE SERPL-SCNC: 110 MMOL/L (ref 100–108)
CO2 SERPL-SCNC: 26 MMOL/L (ref 21–32)
CREAT SERPL-MCNC: 0.55 MG/DL (ref 0.6–1.3)
GLUCOSE SERPL-MCNC: 86 MG/DL (ref 74–99)
POTASSIUM SERPL-SCNC: 3.5 MMOL/L (ref 3.5–5.5)
SODIUM SERPL-SCNC: 145 MMOL/L (ref 136–145)

## 2018-03-28 PROCEDURE — 74011250637 HC RX REV CODE- 250/637: Performed by: INTERNAL MEDICINE

## 2018-03-28 PROCEDURE — 74011250636 HC RX REV CODE- 250/636: Performed by: HOSPITALIST

## 2018-03-28 PROCEDURE — 36415 COLL VENOUS BLD VENIPUNCTURE: CPT | Performed by: INTERNAL MEDICINE

## 2018-03-28 PROCEDURE — 80048 BASIC METABOLIC PNL TOTAL CA: CPT | Performed by: INTERNAL MEDICINE

## 2018-03-28 RX ORDER — CARVEDILOL 6.25 MG/1
6.25 TABLET ORAL 2 TIMES DAILY WITH MEALS
Qty: 60 TAB | Refills: 0 | Status: SHIPPED | OUTPATIENT
Start: 2018-03-28

## 2018-03-28 RX ORDER — SIMVASTATIN 20 MG/1
20 TABLET, FILM COATED ORAL
Qty: 30 TAB | Refills: 0 | Status: SHIPPED | OUTPATIENT
Start: 2018-03-28

## 2018-03-28 RX ORDER — GUAIFENESIN 100 MG/5ML
81 LIQUID (ML) ORAL DAILY
Qty: 30 TAB | Refills: 0 | Status: SHIPPED | OUTPATIENT
Start: 2018-03-29

## 2018-03-28 RX ORDER — LISINOPRIL 5 MG/1
5 TABLET ORAL DAILY
Qty: 30 TAB | Refills: 0 | Status: SHIPPED | OUTPATIENT
Start: 2018-03-29

## 2018-03-28 RX ADMIN — CARVEDILOL 6.25 MG: 3.12 TABLET, FILM COATED ORAL at 09:05

## 2018-03-28 RX ADMIN — TICAGRELOR 90 MG: 90 TABLET ORAL at 09:05

## 2018-03-28 RX ADMIN — LISINOPRIL 5 MG: 5 TABLET ORAL at 09:06

## 2018-03-28 RX ADMIN — ENOXAPARIN SODIUM 80 MG: 80 INJECTION SUBCUTANEOUS at 09:06

## 2018-03-28 RX ADMIN — ASPIRIN 81 MG 81 MG: 81 TABLET ORAL at 09:06

## 2018-03-28 RX ADMIN — LEVOTHYROXINE SODIUM 125 MCG: 125 TABLET ORAL at 07:11

## 2018-03-28 NOTE — ROUTINE PROCESS
Dual AVS reviewed with YANNI Mccoy. All medications reviewed individually with patient. Opportunities for questions and concerns provided. Patient discharged via (mode of transport ie. Car, ambulance or air transport) car  Patient's arm band appropriately discarded.

## 2018-03-28 NOTE — PROGRESS NOTES
Tr band removed without incident, 2x2 and Tegaderm dressing applied, pt tolerated procedure well, neuro/vasc assessment of right arm and hand WNL

## 2018-03-28 NOTE — DISCHARGE SUMMARY
Discharge Summary    Patient: Vernell Velez MRN: [id number]  CSN: [id number]    YOB: 1960  Age: 61 y.o. Sex: female    DOA: 3/26/2018 LOS:  LOS: 2 days   Discharge Date:      Primary Care Provider:  None    Admission Diagnoses: Acute chest pain  CAD (coronary artery disease), native coronary artery  Chest pain  Non-STEMI (non-ST elevated myocardial infarction) [facility])    Discharge Diagnoses:    Problem List as of 3/28/2018  Date Reviewed: 3/27/2018          Codes Class Noted - Resolved    CAD (coronary artery disease), native coronary artery ICD-10-CM: I25.10  ICD-9-CM: 414.01  3/26/2018 - Present        Acute chest pain ICD-10-CM: R07.9  ICD-9-CM: 786.50  3/26/2018 - Present        * (Principal)Non-STEMI (non-ST elevated myocardial infarction) ([facility] ICD-10-CM: I21.4  ICD-9-CM: 410.70  3/26/2018 - Present        Chest pain ICD-10-CM: R07.9  ICD-9-CM: 786.50  3/26/2018 - Present        Diabetes mellitus type 2, controlled ([facility] ICD-10-CM: E11.9  ICD-9-CM: 250.00  3/26/2018 - Present              Discharge Medications:     Current Discharge Medication List      START taking these medications    Details   aspirin 81 mg chewable tablet Take 1 Tab by mouth daily. Qty: 30 Tab, Refills: 0      carvedilol (COREG) 6.25 mg tablet Take 1 Tab by mouth two (2) times daily (with meals). Qty: 60 Tab, Refills: 0      lisinopril (PRINIVIL, ZESTRIL) 5 mg tablet Take 1 Tab by mouth daily. Qty: 30 Tab, Refills: 0      simvastatin (ZOCOR) 20 mg tablet Take 1 Tab by mouth nightly. Qty: 30 Tab, Refills: 0      ticagrelor (BRILINTA) 90 mg tablet Take 1 Tab by mouth two (2) times a day. Qty: 60 Tab, Refills: 0         CONTINUE these medications which have NOT CHANGED    Details   levothyroxine (SYNTHROID) 125 mcg tablet Take 125 mcg by mouth Daily (before breakfast). nitroglycerin (NITROSTAT) 0.4 mg SL tablet by SubLINGual route every five (5) minutes as needed for Chest Pain.          STOP taking these medications       aspirin (ASPIRIN) 325 mg tablet Comments:   Reason for Stopping:               Discharge Condition: Stable    Procedures : Cardiac Catheterization with PCI    Consults: Cardiology      PHYSICAL EXAM    Visit Vitals    /84    Pulse 77    Temp 98.1 °F (36.7 °C)    Resp 16    Ht 5' 5\" (1.651 m)    Wt 84.8 kg (186 lb 15.2 oz)    SpO2 94%    BMI 31.11 kg/m2     General: Awake, cooperative, no acute distress    HEENT: NC, Atraumatic. PERRLA, EOMI. Anicteric sclerae. Lungs:  CTA Bilaterally. No Wheezing/Rhonchi/Rales. Heart:  Regular  rhythm,  No murmur, No Rubs, No Gallops  Abdomen: Soft, Non distended, Non tender. +Bowel sounds,   Extremities: No c/c/e  Psych:   Not anxious or agitated. Neurologic:  No acute neurological deficits. Admission HPI : Vernell Velez is a 61 y.o.  female who has HTN, DM , hyperlipedemia, CAD  Presents with chest pain that felt like a elephant was sitting on her chest radiating to jaw  Relieved by aspirin and Nitro  Started while standing and working at [email or web address]  Similar pain ten years ago Catherization reveled medical management   CV continues to smoke, DM HTN hyperliped  In ER troponin . 26 EKG no acute ST elevation   Felt better and wanted to go home convinced to stay  CV risk factors Smoker, DM HTN, FH    Hospital Course : This patient was admitted to medical services on March 26, 2018 for chest pain. Cardiology was consulted as her troponin levels continued to increase. Echocardiogram was obtained and showed a preserved EF with grade 1 diastolic dysfunction. The patient was seen by Dr Jackeline Ayala, and it was decided to take the patient for cardiac cath. The patient underwent the catheterization and had a stent placed in the RCA. Post stenting, the patient remained stable, with no issues. The patient was counseled on the importance of smoking cessation in the setting of her cardiac disease.      Activity: Activity as tolerated    Diet: Cardiac Diet    Follow-up: This patient was instructed to schedule an appointment with her PCP upon discharge. She was also instructed to follow up with Dr Love Coyle upon discharge for further management of her cardiac disease. Disposition: This patient will be discharged home in stable condition. She underwent cardiac catheterization with PCI by Dr Love Coyle, and has remained stable since. Her cath site shows no signs of bleeding. Her Brilinta Rx has been sent to the in house pharmacy to be filled, and her remaining Rx have been printed for the patient to fill. Minutes spent on discharge: 40       Labs: Results:       Chemistry Recent Labs      03/28/18   0436  03/27/18   1836  03/26/18   0538   GLU  86  93  107*   NA  145  144  138   K  3.5  3.7  3.5   CL  110*  109*  105   CO2  26 27 27   BUN  8  9  9   CREA  0.55*  0.73  0.73   CA  8.5  8.0*  8.6   AGAP  9  8  6   BUCR  15  12  12   AP   --    --   124*   TP   --    --   6.8   ALB   --    --   3.2*   GLOB   --    --   3.6   AGRAT   --    --   0.9      CBC w/Diff Recent Labs      03/27/18   1836  03/27/18   1711  03/26/18   0538  03/26/18   0130   WBC  7.4   --   9.3  10.9   RBC  4.60   --   4.66  4.76   HGB  15.1  14.5  15.0  15.4   HCT  43.5  42.0  44.3  45.4*   PLT  184   --   200  210   GRANS   --    --    --   78*   LYMPH   --    --    --   15*   EOS   --    --    --   2      Cardiac Enzymes Recent Labs      03/26/18   1650  03/26/18   1244   CPK  188  193*   CKND1  4.6*  5.1*      Coagulation Recent Labs      03/26/18   0130   PTP  11.9   INR  0.9       Lipid Panel Lab Results   Component Value Date/Time    Cholesterol, total 156 03/27/2018 06:36 PM    HDL Cholesterol 41 03/27/2018 06:36 PM    LDL, calculated 103.4 (H) 03/27/2018 06:36 PM    VLDL, calculated 11.6 03/27/2018 06:36 PM    Triglyceride 58 03/27/2018 06:36 PM    CHOL/HDL Ratio 3.8 03/27/2018 06:36 PM      BNP No results for input(s): BNPP in the last 72 hours.    Liver Enzymes Recent Labs      03/26/18   0538   TP  6.8   ALB  3.2*   AP  124*   SGOT  44*      Thyroid Studies Lab Results   Component Value Date/Time    TSH 0.25 (L) 03/26/2018 01:30 AM            Significant Diagnostic Studies: Xr Chest Sngl V    Result Date: 3/26/2018  Chest, single view Indication: Chest pain Comparison: 2/28/2010 Findings:  Portable upright AP view of the chest was obtained. The cardiomediastinal silhouette is within normal limits. The pulmonary vasculature is unremarkable. Lung parenchyma is well aerated, without focal consolidation. No pleural effusion nor pneumothorax. No acute osseous abnormality. Impression: No radiographic evidence of an acute abnormality. No results found for this or any previous visit.         CC: None

## 2018-03-28 NOTE — PROGRESS NOTES
0730 Assumed care of pt from [facility] 76.. Pt resting quietly in bed , no signs of distress, call bell within reach. Shift Summary- Shift uneventful. Rested throughout shift, refused Nicotine patch after being offered on multiple occasions. Pt went down for cath procedure at 1500, report was given over the phone to [address] [place] [address] and pt did not return from cath procedure during my shift.

## 2018-03-28 NOTE — ROUTINE PROCESS
Bedside and Verbal shift change report given to [address] [place] [address] (oncoming nurse) by Jovanna Cuadra RN (offgoing nurse). Report included the following information SBAR, Kardex, ED Summary, Procedure Summary, Intake/Output, MAR, Accordion, Recent Results and Med Rec Status.

## 2018-03-28 NOTE — PROGRESS NOTES
D/c plan home today  Met with patient during IDR's. States she cannot wait to go smoke a cigarette. Patient is to see Dr. Brett Elise in 2 weeks. She does not use DME or have other needs at this time. Patient reports she has no needs from cm. States she is ready to go home and waiting on d/c papers. Care Management Interventions  PCP Verified by CM: Yes  Transition of Care Consult (CM Consult): Discharge Planning  Current Support Network:  Other  Confirm Follow Up Transport: Family  Plan discussed with Pt/Family/Caregiver: Yes  Freedom of Choice Offered: Yes  Discharge Location  Discharge Placement: Home with family assistance

## 2018-03-28 NOTE — PROGRESS NOTES
TRANSFER - IN REPORT:    Verbal report received from VINNY Montelongo(name) on [employer]  being received from Cath Lab(unit) for ordered procedure      Report consisted of patients Situation, Background, Assessment and   Recommendations(SBAR). Information from the following report(s) SBAR and Kardex was reviewed with the receiving nurse. Opportunity for questions and clarification was provided. Dressing to R. Radial Cath site C/D/I, Radial pulse palpable, Cap. Refill <3sec    Assessment completed upon patients arrival to unit and care assumed.

## 2018-03-28 NOTE — ROUTINE PROCESS
Bedside and Verbal shift change report given to [address] [place] [address] Avenue (oncoming nurse) by Millie Shanks RN (offgoing nurse). Report included the following information SBAR, Kardex, ED Summary, Procedure Summary, Intake/Output, MAR, Accordion, Recent Results and Med Rec Status.

## 2018-03-28 NOTE — PROGRESS NOTES
Cardiology Progress Note      3/27/2018 8:33 PM    Admit Date: 3/26/2018    Admit Diagnosis: Acute chest pain  CAD (coronary artery disease), native coronary artery  Chest pain  Non-STEMI (non-ST elevated myocardial infarction) ([facility]      Subjective:     Octavio Montalvo denies chest pain.     Visit Vitals    /82    Pulse 69    Temp 99.1 °F (37.3 °C)    Resp 20    Ht 5' 5\" (1.651 m)    Wt 84.3 kg (185 lb 13.6 oz)    SpO2 97%    BMI 30.93 kg/m2     Current Facility-Administered Medications   Medication Dose Route Frequency    enoxaparin (LOVENOX) injection 80 mg  1 mg/kg SubCUTAneous Q12H    bivalirudin (ANGIOMAX) 250 mg in 0.9% sodium chloride (MBP/ADV) 50 mL infusion  1.75 mg/kg/hr IntraVENous CONTINUOUS    insulin lispro (HUMALOG) injection   SubCUTAneous TIDAC    0.9% sodium chloride (MBP/ADV) infusion        bivalirudin (ANGIOMAX) 250 mg injection        0.9% sodium chloride (MBP/ADV) infusion        sodium chloride (NS) flush 5-10 mL  5-10 mL IntraVENous Q8H    sodium chloride (NS) flush 5-10 mL  5-10 mL IntraVENous PRN    acetaminophen (TYLENOL) tablet 650 mg  650 mg Oral Q4H PRN    nitroglycerin (NITROLINGUAL) sublingual 0.4 mg/spray  1 Spray SubLINGual Q5MIN PRN    atropine injection 0.5 mg  0.5 mg IntraVENous PRN    [START ON 3/28/2018] ticagrelor (BRILINTA) tablet 90 mg  90 mg Oral BID    sodium chloride (NS) flush 5-10 mL  5-10 mL IntraVENous Q8H    sodium chloride (NS) flush 5-10 mL  5-10 mL IntraVENous PRN    levothyroxine (SYNTHROID) tablet 125 mcg  125 mcg Oral ACB    nitroglycerin (NITROSTAT) tablet 0.4 mg  0.4 mg SubLINGual Q5MIN PRN    sodium chloride (NS) flush 5-10 mL  5-10 mL IntraVENous Q8H    sodium chloride (NS) flush 5-10 mL  5-10 mL IntraVENous PRN    magnesium hydroxide (MILK OF MAGNESIA) 400 mg/5 mL oral suspension 30 mL  30 mL Oral DAILY PRN    docusate sodium (COLACE) capsule 100 mg  100 mg Oral BID    acetaminophen (TYLENOL) tablet 500 mg  500 mg Oral Q6H PRN    morphine 5 mg  5 mg IntraVENous Q4H PRN    carvedilol (COREG) tablet 6.25 mg  6.25 mg Oral BID WITH MEALS    aspirin chewable tablet 81 mg  81 mg Oral DAILY    lisinopril (PRINIVIL, ZESTRIL) tablet 5 mg  5 mg Oral DAILY    simvastatin (ZOCOR) tablet 20 mg  20 mg Oral QHS    glucose chewable tablet 16 g  4 Tab Oral PRN    glucagon (GLUCAGEN) injection 1 mg  1 mg IntraMUSCular PRN    dextrose (D50W) injection syrg 12.5-25 g  25-50 mL IntraVENous PRN    0.9% sodium chloride infusion  100 mL/hr IntraVENous CONTINUOUS         Objective:      Physical Exam:  Visit Vitals    /82    Pulse 69    Temp 99.1 °F (37.3 °C)    Resp 20    Ht 5' 5\" (1.651 m)    Wt 84.3 kg (185 lb 13.6 oz)    SpO2 97%    BMI 30.93 kg/m2     General Appearance:  Well developed, well nourished,alert and oriented x 3, and individual in no acute distress. Ears/Nose/Mouth/Throat:   Hearing grossly normal.         Neck: Supple. Chest:   Lungs clear to auscultation bilaterally. Cardiovascular:  Regular rate and rhythm, S1, S2 normal, no murmur. Abdomen:   Soft, non-tender, bowel sounds are active. Extremities: No edema bilaterally. Skin: Warm and dry.                Data Review:   Labs:    Recent Results (from the past 24 hour(s))   LIPID PANEL    Collection Time: 03/27/18  6:03 AM   Result Value Ref Range    LIPID PROFILE          Cholesterol, total 151 <200 MG/DL    Triglyceride 137 <150 MG/DL    HDL Cholesterol 41 40 - 60 MG/DL    LDL, calculated 82.6 0 - 100 MG/DL    VLDL, calculated 27.4 MG/DL    CHOL/HDL Ratio 3.7 0 - 5.0     HGB & HCT    Collection Time: 03/27/18  5:11 PM   Result Value Ref Range    HGB 14.5 12.0 - 16.0 g/dL    HCT 42.0 35.0 - 45.0 %   GLUCOSE, POC    Collection Time: 03/27/18  6:34 PM   Result Value Ref Range    Glucose (POC) 86 70 - 110 mg/dL   CBC W/O DIFF    Collection Time: 03/27/18  6:36 PM   Result Value Ref Range    WBC 7.4 4.6 - 13.2 K/uL    RBC 4.60 4.20 - 5.30 M/uL    HGB 15.1 12.0 - 16.0 g/dL    HCT 43.5 35.0 - 45.0 %    MCV 94.6 74.0 - 97.0 FL    MCH 32.8 24.0 - 34.0 PG    MCHC 34.7 31.0 - 37.0 g/dL    RDW 12.5 11.6 - 14.5 %    PLATELET 748 249 - 459 K/uL    MPV 9.5 9.2 - 55.0 FL   METABOLIC PANEL, BASIC    Collection Time: 03/27/18  6:36 PM   Result Value Ref Range    Sodium 144 136 - 145 mmol/L    Potassium 3.7 3.5 - 5.5 mmol/L    Chloride 109 (H) 100 - 108 mmol/L    CO2 27 21 - 32 mmol/L    Anion gap 8 3.0 - 18 mmol/L    Glucose 93 74 - 99 mg/dL    BUN 9 7.0 - 18 MG/DL    Creatinine 0.73 0.6 - 1.3 MG/DL    BUN/Creatinine ratio 12 12 - 20      GFR est AA >60 >60 ml/min/1.73m2    GFR est non-AA >60 >60 ml/min/1.73m2    Calcium 8.0 (L) 8.5 - 10.1 MG/DL   LIPID PANEL    Collection Time: 03/27/18  6:36 PM   Result Value Ref Range    LIPID PROFILE          Cholesterol, total 156 <200 MG/DL    Triglyceride 58 <150 MG/DL    HDL Cholesterol 41 40 - 60 MG/DL    LDL, calculated 103.4 (H) 0 - 100 MG/DL    VLDL, calculated 11.6 MG/DL    CHOL/HDL Ratio 3.8 0 - 5.0         Telemetry: normal sinus rhythm      Assessment:     Principal Problem:    Non-STEMI (non-ST elevated myocardial infarction) ([facility] (3/26/2018)    Active Problems:    CAD (coronary artery disease), native coronary artery (3/26/2018)      Acute chest pain (3/26/2018)      Chest pain (3/26/2018)      Diabetes mellitus type 2, controlled ([facility] (3/26/2018)        Plan:     The patient is stable post PCI. If she remains stable overnight, she may be discharged tomorrow morning. Will continue to follow.     Wilver Amor MD

## 2018-03-28 NOTE — DISCHARGE INSTRUCTIONS
DISCHARGE SUMMARY from Nurse    PATIENT INSTRUCTIONS:    Recommended activity: Activity as tolerated. *  Please give a list of your current medications to your Primary Care Provider. *  Please update this list whenever your medications are discontinued, doses are      changed, or new medications (including over-the-counter products) are added. *  Please carry medication information at all times in case of emergency situations. These are general instructions for a healthy lifestyle:    No smoking/ No tobacco products/ Avoid exposure to second hand smoke  Surgeon General's Warning:  Quitting smoking now greatly reduces serious risk to your health. Obesity, smoking, and sedentary lifestyle greatly increases your risk for illness    A healthy diet, regular physical exercise & weight monitoring are important for maintaining a healthy lifestyle    You may be retaining fluid if you have a history of heart failure or if you experience any of the following symptoms:  Weight gain of 3 pounds or more overnight or 5 pounds in a week, increased swelling in our hands or feet or shortness of breath while lying flat in bed. Please call your doctor as soon as you notice any of these symptoms; do not wait until your next office visit. Recognize signs and symptoms of STROKE:    F-face looks uneven    A-arms unable to move or move unevenly    S-speech slurred or non-existent    T-time-call 911 as soon as signs and symptoms begin-DO NOT go       Back to bed or wait to see if you get better-TIME IS BRAIN. Warning Signs of HEART ATTACK     Call 911 if you have these symptoms:   Chest discomfort. Most heart attacks involve discomfort in the center of the chest that lasts more than a few minutes, or that goes away and comes back. It can feel like uncomfortable pressure, squeezing, fullness, or pain.  Discomfort in other areas of the upper body.  Symptoms can include pain or discomfort in one or both arms, the back, neck, jaw, or stomach.  Shortness of breath with or without chest discomfort.  Other signs may include breaking out in a cold sweat, nausea, or lightheadedness. Don't wait more than five minutes to call 911 - MINUTES MATTER! Fast action can save your life. Calling 911 is almost always the fastest way to get lifesaving treatment. Emergency Medical Services staff can begin treatment when they arrive -- up to an hour sooner than if someone gets to the hospital by car. The discharge information has been reviewed with the patient. The patient verbalized understanding. Discharge medications reviewed with the patient and appropriate educational materials and side effects teaching were provided. ___________________________________________________________________________________________________________________________________     Learning About Coronary Artery Disease (CAD)  What is coronary artery disease? Coronary artery disease (CAD) occurs when plaque builds up in the arteries that bring oxygen-rich blood to your heart. Plaque is a fatty substance made of cholesterol, calcium, and other substances in the blood. This process is called hardening of the arteries, or atherosclerosis. What happens when you have coronary artery disease? · Plaque may narrow the coronary arteries. Narrowed arteries cause poor blood flow. This can lead to angina symptoms such as chest pain or discomfort. If blood flow is completely blocked, you could have a heart attack. · You can slow CAD and reduce the risk of future problems by making changes in your lifestyle. These include quitting smoking and eating heart-healthy foods. · Treatments for CAD, along with changes in your lifestyle, can help you live a longer and healthier life. How can you prevent coronary artery disease? · Do not smoke. It may be the best thing you can do to prevent heart disease.  If you need help quitting, talk to your doctor about stop-smoking programs and medicines. These can increase your chances of quitting for good. · Be active. Get at least 30 minutes of exercise on most days of the week. Walking is a good choice. You also may want to do other activities, such as running, swimming, cycling, or playing tennis or team sports. · Eat heart-healthy foods. Eat more fruits and vegetables and less foods that contain saturated and trans fats. Limit alcohol, sodium, and sweets. · Stay at a healthy weight. Lose weight if you need to. · Manage other health problems such as diabetes, high blood pressure, and high cholesterol. · Manage stress. Stress can hurt your heart. To keep stress low, talk about your problems and feelings. Don't keep your feelings hidden. · If you have talked about it with your doctor, take a low-dose aspirin every day. Aspirin can help certain people lower their risk of a heart attack or stroke. But taking aspirin isn't right for everyone, because it can cause serious bleeding. Do not start taking daily aspirin unless your doctor knows about it. How is coronary artery disease treated? · Your doctor will suggest that you make lifestyle changes. For example, your doctor may ask you to eat healthy foods, quit smoking, lose extra weight, and be more active. · You will have to take medicines. · Your doctor may suggest a procedure to open narrowed or blocked arteries. This is called angioplasty. Or your doctor may suggest using healthy blood vessels to create detours around narrowed or blocked arteries. This is called bypass surgery. Follow-up care is a key part of your treatment and safety. Be sure to make and go to all appointments, and call your doctor if you are having problems. It's also a good idea to know your test results and keep a list of the medicines you take. Where can you learn more? Go to http://peyton-leandra.info/.   Enter [phone] in the search box to learn more about \"Learning About Coronary Artery Disease (CAD). \"  Current as of: September 21, 2016  Content Version: 11.4  © 1692-6191 [email or web address]. Care instructions adapted under license by [employer] (which disclaims liability or warranty for this information). If you have questions about a medical condition or this instruction, always ask your healthcare professional. [address] any warranty or liability for your use of this information. Chest Pain: Care Instructions  Your Care Instructions    There are many things that can cause chest pain. Some are not serious and will get better on their own in a few days. But some kinds of chest pain need more testing and treatment. Your doctor may have recommended a follow-up visit in the next 8 to 12 hours. If you are not getting better, you may need more tests or treatment. Even though your doctor has released you, you still need to watch for any problems. The doctor carefully checked you, but sometimes problems can develop later. If you have new symptoms or if your symptoms do not get better, get medical care right away. If you have worse or different chest pain or pressure that lasts more than 5 minutes or you passed out (lost consciousness), call 911 or seek other emergency help right away. A medical visit is only one step in your treatment. Even if you feel better, you still need to do what your doctor recommends, such as going to all suggested follow-up appointments and taking medicines exactly as directed. This will help you recover and help prevent future problems. How can you care for yourself at home? · Rest until you feel better. · Take your medicine exactly as prescribed. Call your doctor if you think you are having a problem with your medicine. · Do not drive after taking a prescription pain medicine. When should you call for help? Call 911 if:  ? · You passed out (lost consciousness). ? · You have severe difficulty breathing.    ? · You have symptoms of a heart attack. These may include:  ¨ Chest pain or pressure, or a strange feeling in your chest.  ¨ Sweating. ¨ Shortness of breath. ¨ Nausea or vomiting. ¨ Pain, pressure, or a strange feeling in your back, neck, jaw, or upper belly or in one or both shoulders or arms. ¨ Lightheadedness or sudden weakness. ¨ A fast or irregular heartbeat. After you call 911, the  may tell you to chew 1 adult-strength or 2 to 4 low-dose aspirin. Wait for an ambulance. Do not try to drive yourself. ?Call your doctor today if:  ? · You have any trouble breathing. ? · Your chest pain gets worse. ? · You are dizzy or lightheaded, or you feel like you may faint. ? · You are not getting better as expected. ? · You are having new or different chest pain. Where can you learn more? Go to http://peytonIntersoft Eurasialeandra.info/. Enter A120 in the search box to learn more about \"Chest Pain: Care Instructions. \"  Current as of: March 20, 2017  Content Version: 11.4  © 6298-0249 [employer]. Care instructions adapted under license by [employer] (which disclaims liability or warranty for this information). If you have questions about a medical condition or this instruction, always ask your healthcare professional. [address] any warranty or liability for your use of this information. Percutaneous Coronary Intervention: What to Expect at [facility]    Percutaneous coronary intervention (PCI) is the name for procedures that are used to open a narrowed or blocked coronary artery. The two most common PCI procedures are coronary angioplasty and coronary stent placement. Your groin or arm may have a bruise and feel sore for a day or two after a percutaneous coronary intervention (PCI). You can do light activities around the house, but nothing strenuous for several days.   This care sheet gives you a general idea about how long it will take for you to recover. But each person recovers at a different pace. Follow the steps below to get better as quickly as possible. How can you care for yourself at home? Activity  ? · If the doctor gave you a sedative:  ¨ For 24 hours, don't do anything that requires attention to detail. It takes time for the medicine's effects to completely wear off. ¨ For your safety, do not drive or operate any machinery that could be dangerous. Wait until the medicine wears off and you can think clearly and react easily. ? · Do not do strenuous exercise and do not lift, pull, or push anything heavy until your doctor says it is okay. This may be for a day or two. You can walk around the house and do light activity, such as cooking. ? · If the catheter was placed in your groin, try not to walk up stairs for the first couple of days. ? · If the catheter was placed in your arm near your wrist, do not bend your wrist deeply for the first couple of days. Be careful using your hand to get into and out of a chair or bed. ? · Carry your stent identification card with you at all times. ? · If your doctor recommends it, get more exercise. Walking is a good choice. Bit by bit, increase the amount you walk every day. Try for at least 30 minutes on most days of the week. Diet  ? · Drink plenty of fluids to help your body flush out the dye. If you have kidney, heart, or liver disease and have to limit fluids, talk with your doctor before you increase the amount of fluids you drink. ? · Keep eating a heart-healthy diet that has lots of fruits, vegetables, and whole grains. If you have not been eating this way, talk to your doctor. You also may want to talk to a dietitian. This expert can help you to learn about healthy foods and plan meals. Medicines  ? · Your doctor will tell you if and when you can restart your medicines. He or she will also give you instructions about taking any new medicines.    ? · If you take blood thinners, such as warfarin (Coumadin), clopidogrel (Plavix), or aspirin, be sure to talk to your doctor. He or she will tell you if and when to start taking those medicines again. Make sure that you understand exactly what your doctor wants you to do.   ? · Your doctor will prescribe blood-thinning medicines. You will likely take aspirin plus another antiplatelet, such as clopidogrel (Plavix). It is very important that you take these medicines exactly as directed. These medicines help keep the coronary artery open and reduce your risk of a heart attack. ? · Call your doctor if you think you are having a problem with your medicine. ?Care of the catheter site  ? · For 1 or 2 days, keep a bandage over the spot where the catheter was inserted. The bandage probably will fall off in this time. ? · Put ice or a cold pack on the area for 10 to 20 minutes at a time to help with soreness or swelling. Put a thin cloth between the ice and your skin. ? · You may shower 24 to 48 hours after the procedure, if your doctor okays it. Pat the incision dry. ? · Do not soak the catheter site until it is healed. Don't take a bath for 1 week, or until your doctor tells you it isokay. Follow-up care is a key part of your treatment and safety. Be sure to make and go to all appointments, and call your doctor if you are having problems. It's also a good idea to know your test results and keep a list of the medicines you take. When should you call for help? Call 911 anytime you think you may need emergency care. For example, call if:  ? · You passed out (lost consciousness). ? · You have severe trouble breathing. ? · You have sudden chest pain and shortness of breath, or you cough up blood. ? · You have symptoms of a heart attack, such as:  ¨ Chest pain or pressure. ¨ Sweating. ¨ Shortness of breath. ¨ Nausea or vomiting. ¨ Pain that spreads from the chest to the neck, jaw, or one or both shoulders or arms.   ¨ Dizziness or lightheadedness. ¨ A fast or uneven pulse. After calling 911, chew 1 adult-strength aspirin. Wait for an ambulance. Do not try to drive yourself. ? · You have been diagnosed with angina, and you have angina symptoms that do not go away with rest or are not getting better within 5 minutes after you take one dose of nitroglycerin. ?Call your doctor now or seek immediate medical care if:  ? · You are bleeding from the area where the catheter was put in your artery. ? · You have a fast-growing, painful lump at the catheter site. ? · You have signs of infection, such as:  ¨ Increased pain, swelling, warmth, or redness. ¨ Red streaks leading from the catheter site. ¨ Pus draining from the catheter site. ¨ A fever. ? · Your leg or arm looks blue or feels cold, numb, or tingly. ? Watch closely for changes in your health, and be sure to contact your doctor if you have any problems. Where can you learn more? Go to http://peyton-leandra.info/. Enter U293 in the search box to learn more about \"Percutaneous Coronary Intervention: What to Expect at Home. \"  Current as of: September 21, 2016  Content Version: 11.4  © 9036-9019 [employer]. Care instructions adapted under license by [employer] (which disclaims liability or warranty for this information). If you have questions about a medical condition or this instruction, always ask your healthcare professional. [place] [address] any warranty or liability for your use of this information.

## 2018-03-28 NOTE — PROGRESS NOTES
2205: Bp elevated 179/99, HR 75; Dr. Damion Da Silva made aware, instructed to administer Metoprolol 5mg IV Now, And decrease maintenance fluids to a rate of 75ml/hr; order read back and verified     2334:BP still elevated 173/94, HR 58; Dr. Damion Da Silva made aware, instructed to administered Hydralazine 10mg Q6H PRN for SBP> 170, order read back and verified; Scant amount of blood noted in urine, Dr. Damion Da Silva made aware, order obtained to hold lovenox    0009: Hydralazine administered, BP reassessed 147/84    0650: Shift otherwise uneventful    0743: Bedside shift change report given to Mary Mcmanus (oncoming nurse) by Esther Paige (offgoing nurse). Report included the following information SBAR and Kardex.

## 2018-03-29 NOTE — PROGRESS NOTES
Cardiology Progress Note      3/28/2018 9:36 PM    Admit Date: 3/26/2018    Admit Diagnosis: Acute chest pain  CAD (coronary artery disease), native coronary artery  Chest pain  Non-STEMI (non-ST elevated myocardial infarction) ([facility] [address]      Subjective:     Fazal Panda denies chest pain. Visit Vitals    /84    Pulse 77    Temp 98.1 °F (36.7 °C)    Resp 16    Ht 5' 5\" (1.651 m)    Wt 84.8 kg (186 lb 15.2 oz)    SpO2 94%    BMI 31.11 kg/m2     No current facility-administered medications for this encounter. Current Outpatient Prescriptions   Medication Sig    [START ON 3/29/2018] aspirin 81 mg chewable tablet Take 1 Tab by mouth daily.  carvedilol (COREG) 6.25 mg tablet Take 1 Tab by mouth two (2) times daily (with meals).  [START ON 3/29/2018] lisinopril (PRINIVIL, ZESTRIL) 5 mg tablet Take 1 Tab by mouth daily.  simvastatin (ZOCOR) 20 mg tablet Take 1 Tab by mouth nightly.  ticagrelor (BRILINTA) 90 mg tablet Take 1 Tab by mouth two (2) times a day.  levothyroxine (SYNTHROID) 125 mcg tablet Take 125 mcg by mouth Daily (before breakfast).  nitroglycerin (NITROSTAT) 0.4 mg SL tablet by SubLINGual route every five (5) minutes as needed for Chest Pain. Objective:      Physical Exam:  Visit Vitals    /84    Pulse 77    Temp 98.1 °F (36.7 °C)    Resp 16    Ht 5' 5\" (1.651 m)    Wt 84.8 kg (186 lb 15.2 oz)    SpO2 94%    BMI 31.11 kg/m2     General Appearance:  Well developed, well nourished,alert and oriented x 3, and individual in no acute distress. Ears/Nose/Mouth/Throat:   Hearing grossly normal.         Neck: Supple. Chest:   Lungs clear to auscultation bilaterally. Cardiovascular:  Regular rate and rhythm, S1, S2 normal, no murmur. Abdomen:   Soft, non-tender, bowel sounds are active. Extremities: No edema bilaterally. Skin: Warm and dry.                Data Review:   Labs:    Recent Results (from the past 24 hour(s))   METABOLIC PANEL, BASIC    Collection Time: 03/28/18  4:36 AM   Result Value Ref Range    Sodium 145 136 - 145 mmol/L    Potassium 3.5 3.5 - 5.5 mmol/L    Chloride 110 (H) 100 - 108 mmol/L    CO2 26 21 - 32 mmol/L    Anion gap 9 3.0 - 18 mmol/L    Glucose 86 74 - 99 mg/dL    BUN 8 7.0 - 18 MG/DL    Creatinine 0.55 (L) 0.6 - 1.3 MG/DL    BUN/Creatinine ratio 15 12 - 20      GFR est AA >60 >60 ml/min/1.73m2    GFR est non-AA >60 >60 ml/min/1.73m2    Calcium 8.5 8.5 - 10.1 MG/DL       Telemetry: normal sinus rhythm      Assessment:     Principal Problem:    Non-STEMI (non-ST elevated myocardial infarction) ([facility] [address] (3/26/2018)    Active Problems:    CAD (coronary artery disease), native coronary artery (3/26/2018)      Acute chest pain (3/26/2018)      Chest pain (3/26/2018)      Diabetes mellitus type 2, controlled ([facility] [address] (3/26/2018)        Plan:     The patient has been stable overnight. Her cardiac catheterization radial site is benign. She may be discharged. Follow up with me in a couple of weeks.     Zahira Reyna MD

## 2018-04-01 LAB
ATRIAL RATE: 63 BPM
ATRIAL RATE: 76 BPM
CALCULATED P AXIS, ECG09: 61 DEGREES
CALCULATED P AXIS, ECG09: 79 DEGREES
CALCULATED R AXIS, ECG10: 23 DEGREES
CALCULATED R AXIS, ECG10: 73 DEGREES
CALCULATED T AXIS, ECG11: 47 DEGREES
CALCULATED T AXIS, ECG11: 47 DEGREES
DIAGNOSIS, 93000: NORMAL
DIAGNOSIS, 93000: NORMAL
P-R INTERVAL, ECG05: 134 MS
P-R INTERVAL, ECG05: 138 MS
Q-T INTERVAL, ECG07: 402 MS
Q-T INTERVAL, ECG07: 434 MS
QRS DURATION, ECG06: 82 MS
QRS DURATION, ECG06: 84 MS
QTC CALCULATION (BEZET), ECG08: 444 MS
QTC CALCULATION (BEZET), ECG08: 452 MS
VENTRICULAR RATE, ECG03: 63 BPM
VENTRICULAR RATE, ECG03: 76 BPM

## 2018-09-19 ENCOUNTER — APPOINTMENT (OUTPATIENT)
Dept: CT IMAGING | Age: 58
End: 2018-09-19
Attending: PHYSICIAN ASSISTANT
Payer: OTHER GOVERNMENT

## 2018-09-19 ENCOUNTER — HOSPITAL ENCOUNTER (EMERGENCY)
Age: 58
Discharge: HOME | End: 2018-09-19
Attending: EMERGENCY MEDICINE
Payer: OTHER GOVERNMENT

## 2018-09-19 VITALS
WEIGHT: 180 LBS | SYSTOLIC BLOOD PRESSURE: 164 MMHG | OXYGEN SATURATION: 99 % | HEART RATE: 70 BPM | DIASTOLIC BLOOD PRESSURE: 94 MMHG | HEIGHT: 65 IN | RESPIRATION RATE: 16 BRPM | TEMPERATURE: 97.8 F | BODY MASS INDEX: 29.99 KG/M2

## 2018-09-19 DIAGNOSIS — N13.2 URETERAL STONE WITH HYDRONEPHROSIS: Primary | ICD-10-CM

## 2018-09-19 LAB
ALBUMIN SERPL-MCNC: 3.6 G/DL (ref 3.4–5)
ALBUMIN/GLOB SERPL: 0.9 {RATIO} (ref 0.8–1.7)
ALP SERPL-CCNC: 122 U/L (ref 45–117)
ALT SERPL-CCNC: 26 U/L (ref 13–56)
ANION GAP SERPL CALC-SCNC: 9 MMOL/L (ref 3–18)
APPEARANCE UR: CLEAR
AST SERPL-CCNC: 18 U/L (ref 15–37)
BACTERIA URNS QL MICRO: ABNORMAL /HPF
BASOPHILS # BLD: 0 K/UL (ref 0–0.1)
BASOPHILS NFR BLD: 0 % (ref 0–2)
BILIRUB SERPL-MCNC: 0.5 MG/DL (ref 0.2–1)
BILIRUB UR QL: NEGATIVE
BUN SERPL-MCNC: 10 MG/DL (ref 7–18)
BUN/CREAT SERPL: 11 (ref 12–20)
CALCIUM SERPL-MCNC: 9.2 MG/DL (ref 8.5–10.1)
CHLORIDE SERPL-SCNC: 107 MMOL/L (ref 100–108)
CK MB CFR SERPL CALC: NORMAL % (ref 0–4)
CK MB SERPL-MCNC: <1 NG/ML (ref 5–25)
CK SERPL-CCNC: 102 U/L (ref 26–192)
CO2 SERPL-SCNC: 26 MMOL/L (ref 21–32)
COLOR UR: ABNORMAL
CREAT SERPL-MCNC: 0.92 MG/DL (ref 0.6–1.3)
DIFFERENTIAL METHOD BLD: ABNORMAL
EOSINOPHIL # BLD: 0.1 K/UL (ref 0–0.4)
EOSINOPHIL NFR BLD: 1 % (ref 0–5)
EPITH CASTS URNS QL MICRO: ABNORMAL /LPF (ref 0–5)
ERYTHROCYTE [DISTWIDTH] IN BLOOD BY AUTOMATED COUNT: 12.5 % (ref 11.6–14.5)
GLOBULIN SER CALC-MCNC: 4.2 G/DL (ref 2–4)
GLUCOSE SERPL-MCNC: 152 MG/DL (ref 74–99)
GLUCOSE UR STRIP.AUTO-MCNC: NEGATIVE MG/DL
HCT VFR BLD AUTO: 45.3 % (ref 35–45)
HGB BLD-MCNC: 15.6 G/DL (ref 12–16)
HGB UR QL STRIP: ABNORMAL
KETONES UR QL STRIP.AUTO: 15 MG/DL
LEUKOCYTE ESTERASE UR QL STRIP.AUTO: ABNORMAL
LIPASE SERPL-CCNC: 86 U/L (ref 73–393)
LYMPHOCYTES # BLD: 1.4 K/UL (ref 0.9–3.6)
LYMPHOCYTES NFR BLD: 10 % (ref 21–52)
MCH RBC QN AUTO: 31.8 PG (ref 24–34)
MCHC RBC AUTO-ENTMCNC: 34.4 G/DL (ref 31–37)
MCV RBC AUTO: 92.4 FL (ref 74–97)
MONOCYTES # BLD: 0.6 K/UL (ref 0.05–1.2)
MONOCYTES NFR BLD: 4 % (ref 3–10)
NEUTS SEG # BLD: 12.7 K/UL (ref 1.8–8)
NEUTS SEG NFR BLD: 85 % (ref 40–73)
NITRITE UR QL STRIP.AUTO: NEGATIVE
PH UR STRIP: 7 [PH] (ref 5–8)
PLATELET # BLD AUTO: 230 K/UL (ref 135–420)
PMV BLD AUTO: 9.4 FL (ref 9.2–11.8)
POTASSIUM SERPL-SCNC: 4.8 MMOL/L (ref 3.5–5.5)
PROT SERPL-MCNC: 7.8 G/DL (ref 6.4–8.2)
PROT UR STRIP-MCNC: NEGATIVE MG/DL
RBC # BLD AUTO: 4.9 M/UL (ref 4.2–5.3)
RBC #/AREA URNS HPF: ABNORMAL /HPF (ref 0–5)
SODIUM SERPL-SCNC: 142 MMOL/L (ref 136–145)
SP GR UR REFRACTOMETRY: 1.01 (ref 1–1.03)
TROPONIN I SERPL-MCNC: <0.02 NG/ML (ref 0–0.06)
UROBILINOGEN UR QL STRIP.AUTO: 0.2 EU/DL (ref 0.2–1)
WBC # BLD AUTO: 14.8 K/UL (ref 4.6–13.2)
WBC URNS QL MICRO: ABNORMAL /HPF (ref 0–5)

## 2018-09-19 PROCEDURE — 99283 EMERGENCY DEPT VISIT LOW MDM: CPT

## 2018-09-19 PROCEDURE — 74011250637 HC RX REV CODE- 250/637: Performed by: PHYSICIAN ASSISTANT

## 2018-09-19 PROCEDURE — 74176 CT ABD & PELVIS W/O CONTRAST: CPT

## 2018-09-19 PROCEDURE — 96376 TX/PRO/DX INJ SAME DRUG ADON: CPT

## 2018-09-19 PROCEDURE — 96375 TX/PRO/DX INJ NEW DRUG ADDON: CPT

## 2018-09-19 PROCEDURE — 85025 COMPLETE CBC W/AUTO DIFF WBC: CPT | Performed by: PHYSICIAN ASSISTANT

## 2018-09-19 PROCEDURE — 93005 ELECTROCARDIOGRAM TRACING: CPT

## 2018-09-19 PROCEDURE — 80053 COMPREHEN METABOLIC PANEL: CPT | Performed by: PHYSICIAN ASSISTANT

## 2018-09-19 PROCEDURE — 74011250636 HC RX REV CODE- 250/636: Performed by: PHYSICIAN ASSISTANT

## 2018-09-19 PROCEDURE — 82550 ASSAY OF CK (CPK): CPT | Performed by: PHYSICIAN ASSISTANT

## 2018-09-19 PROCEDURE — 81001 URINALYSIS AUTO W/SCOPE: CPT | Performed by: PHYSICIAN ASSISTANT

## 2018-09-19 PROCEDURE — 96374 THER/PROPH/DIAG INJ IV PUSH: CPT

## 2018-09-19 PROCEDURE — 96361 HYDRATE IV INFUSION ADD-ON: CPT

## 2018-09-19 PROCEDURE — 83690 ASSAY OF LIPASE: CPT | Performed by: PHYSICIAN ASSISTANT

## 2018-09-19 RX ORDER — TAMSULOSIN HYDROCHLORIDE 0.4 MG/1
0.4 CAPSULE ORAL
Qty: 7 CAP | Refills: 0 | Status: SHIPPED | OUTPATIENT
Start: 2018-09-19 | End: 2018-09-19

## 2018-09-19 RX ORDER — CEPHALEXIN 500 MG/1
500 CAPSULE ORAL 2 TIMES DAILY
Qty: 14 CAP | Refills: 0 | Status: SHIPPED | OUTPATIENT
Start: 2018-09-19 | End: 2018-09-26

## 2018-09-19 RX ORDER — FENTANYL CITRATE 50 UG/ML
50 INJECTION, SOLUTION INTRAMUSCULAR; INTRAVENOUS
Status: COMPLETED | OUTPATIENT
Start: 2018-09-19 | End: 2018-09-19

## 2018-09-19 RX ORDER — TAMSULOSIN HYDROCHLORIDE 0.4 MG/1
0.4 CAPSULE ORAL ONCE
Status: COMPLETED | OUTPATIENT
Start: 2018-09-19 | End: 2018-09-19

## 2018-09-19 RX ORDER — OXYCODONE AND ACETAMINOPHEN 5; 325 MG/1; MG/1
1 TABLET ORAL
Qty: 16 TAB | Refills: 0 | Status: ON HOLD | OUTPATIENT
Start: 2018-09-19 | End: 2018-11-13 | Stop reason: SDUPTHER

## 2018-09-19 RX ORDER — ONDANSETRON 4 MG/1
4 TABLET, ORALLY DISINTEGRATING ORAL
Qty: 12 TAB | Refills: 0 | Status: SHIPPED | OUTPATIENT
Start: 2018-09-19

## 2018-09-19 RX ORDER — TAMSULOSIN HYDROCHLORIDE 0.4 MG/1
0.4 CAPSULE ORAL
Qty: 7 CAP | Refills: 0 | Status: SHIPPED | OUTPATIENT
Start: 2018-09-19 | End: 2018-09-26

## 2018-09-19 RX ORDER — ONDANSETRON 2 MG/ML
4 INJECTION INTRAMUSCULAR; INTRAVENOUS
Status: COMPLETED | OUTPATIENT
Start: 2018-09-19 | End: 2018-09-19

## 2018-09-19 RX ORDER — DICLOFENAC SODIUM 75 MG/1
TABLET, DELAYED RELEASE ORAL
COMMUNITY
End: 2018-11-09

## 2018-09-19 RX ORDER — MORPHINE SULFATE 4 MG/ML
4 INJECTION INTRAVENOUS
Status: COMPLETED | OUTPATIENT
Start: 2018-09-19 | End: 2018-09-19

## 2018-09-19 RX ORDER — KETOROLAC TROMETHAMINE 30 MG/ML
15 INJECTION, SOLUTION INTRAMUSCULAR; INTRAVENOUS
Status: COMPLETED | OUTPATIENT
Start: 2018-09-19 | End: 2018-09-19

## 2018-09-19 RX ADMIN — TAMSULOSIN HYDROCHLORIDE 0.4 MG: 0.4 CAPSULE ORAL at 18:23

## 2018-09-19 RX ADMIN — KETOROLAC TROMETHAMINE 15 MG: 30 INJECTION, SOLUTION INTRAMUSCULAR at 19:00

## 2018-09-19 RX ADMIN — ONDANSETRON 4 MG: 2 INJECTION INTRAMUSCULAR; INTRAVENOUS at 18:23

## 2018-09-19 RX ADMIN — FENTANYL CITRATE 50 MCG: 50 INJECTION, SOLUTION INTRAMUSCULAR; INTRAVENOUS at 18:23

## 2018-09-19 RX ADMIN — SODIUM CHLORIDE 1000 ML: 900 INJECTION, SOLUTION INTRAVENOUS at 18:23

## 2018-09-19 RX ADMIN — ONDANSETRON 4 MG: 2 INJECTION INTRAMUSCULAR; INTRAVENOUS at 19:00

## 2018-09-19 RX ADMIN — MORPHINE SULFATE 4 MG: 4 INJECTION INTRAVENOUS at 19:00

## 2018-09-19 NOTE — ED TRIAGE NOTES
Patient with acute onset of right flank pain that started about 1400. Patient reports vomiting and cold chills.

## 2018-09-19 NOTE — ED PROVIDER NOTES
EMERGENCY DEPARTMENT HISTORY AND PHYSICAL EXAM 
 
Date: 9/19/2018 Patient Name: Trina Mejias History of Presenting Illness Chief Complaint Patient presents with  Flank Pain History Provided By: Patient Chief Complaint: right flank pain Duration: 4 Hours Timing:  Acute Location: right flank Severity: Severe Modifying Factors: none Associated Symptoms: nausea, vomiting, RLQ abdominal pain Additional History (Context):  
5:58 PM  
Trina Mejias is a 60 y.o. female with PMHX of [address] who presents to the emergency department C/O sudden onset of severe right flank pain starting 4 hours ago which radiates to the RLQ. Associated sxs include nausea and vomiting. Pt has not taken any mediations for her sxs. Denies hx of kidney stones, or fhx of kidney stones. Pt takes ASA and Brilinta as reports hx of stent. Pt denies hematuria, dysuria, and any other sxs or complaints. PCP: Berhane Peraza MD 
 
Current Outpatient Prescriptions Medication Sig Dispense Refill  diclofenac EC (VOLTAREN) 75 mg EC tablet Take  by mouth.  oxyCODONE-acetaminophen (PERCOCET) 5-325 mg per tablet Take 1 Tab by mouth every four (4) hours as needed for Pain. Max Daily Amount: 6 Tabs. 16 Tab 0  
 ondansetron (ZOFRAN ODT) 4 mg disintegrating tablet Take 1 Tab by mouth every eight (8) hours as needed for Nausea (or vomiting.). Allow to dissolve on tongue 12 Tab 0  
 tamsulosin (FLOMAX) 0.4 mg capsule Take 1 Cap by mouth nightly for 7 days. Take nightly to help pass stone 7 Cap 0  cephALEXin (KEFLEX) 500 mg capsule Take 1 Cap by mouth two (2) times a day for 7 days. Indications: BACTERIAL URINARY TRACT INFECTION 14 Cap 0  
 aspirin 81 mg chewable tablet Take 1 Tab by mouth daily. 30 Tab 0  carvedilol (COREG) 6.25 mg tablet Take 1 Tab by mouth two (2) times daily (with meals). 60 Tab 0  
 lisinopril (PRINIVIL, ZESTRIL) 5 mg tablet Take 1 Tab by mouth daily.  30 Tab 0  
  simvastatin (ZOCOR) 20 mg tablet Take 1 Tab by mouth nightly. 30 Tab 0  
 ticagrelor (BRILINTA) 90 mg tablet Take 1 Tab by mouth two (2) times a day. 60 Tab 0  
 levothyroxine (SYNTHROID) 125 mcg tablet Take 125 mcg by mouth Daily (before breakfast).  nitroglycerin (NITROSTAT) 0.4 mg SL tablet by SubLINGual route every five (5) minutes as needed for Chest Pain. Past History Past Medical History: 
Past Medical History:  
Diagnosis Date  Diabetes ([facility] [address]  Endocrine disease  Hypertension Past Surgical History: 
Past Surgical History:  
Procedure Laterality Date  HX GYN    
 HX ORTHOPAEDIC Family History: 
History reviewed. No pertinent family history. Social History: 
Social History Substance Use Topics  Smoking status: Current Every Day Smoker Packs/day: 1.00  Smokeless tobacco: Never Used  Alcohol use No  
 
 
Allergies: 
No Known Allergies Review of Systems Review of Systems Constitutional: Negative for chills and fever. Respiratory: Negative for shortness of breath. Cardiovascular: Negative for chest pain. Gastrointestinal: Positive for abdominal pain (RLQ ), nausea and vomiting. Negative for diarrhea. Genitourinary: Positive for flank pain (right). Negative for difficulty urinating, dysuria, frequency, hematuria and urgency. Musculoskeletal: Positive for back pain. Neurological: Negative for light-headedness and headaches. All other systems reviewed and are negative. Physical Exam  
 
Vitals:  
 09/19/18 1739 09/19/18 1826 09/19/18 2109 BP: (!) 157/108 (!) 168/103 (!) 164/94 Pulse: 61 62 70 Resp: 20 13 16 Temp: 97.8 °F (36.6 °C) SpO2: 100% 97% 99% Weight: 81.6 kg (180 lb) Height: 5' 5\" (1.651 m) Physical Exam  
Constitutional: She is oriented to person, place, and time. She appears well-developed and well-nourished. She appears distressed.  female that appears uncomfortable. Yells out in pain at times. Follows directions. HENT:  
Head: Normocephalic and atraumatic. Right Ear: External ear normal.  
Left Ear: External ear normal.  
Mouth/Throat: Mucous membranes are normal.  
Eyes: Conjunctivae are normal.  
Neck: Normal range of motion. Cardiovascular: Normal rate, regular rhythm, normal heart sounds and intact distal pulses. Exam reveals no gallop and no friction rub. No murmur heard. Pulmonary/Chest: Effort normal and breath sounds normal. No accessory muscle usage. No tachypnea. No respiratory distress. She has no decreased breath sounds. She has no wheezes. She has no rhonchi. She has no rales. Abdominal: Soft. Normal appearance. She exhibits no mass. There is tenderness in the right lower quadrant. There is CVA tenderness (right CVA). There is no rigidity, no rebound and no guarding. Obese abdomen Musculoskeletal: Normal range of motion. Neurological: She is alert and oriented to person, place, and time. Skin: Skin is warm and dry. She is not diaphoretic. Psychiatric: She has a normal mood and affect. Judgment normal.  
Nursing note and vitals reviewed. Diagnostic Study Results Labs - No results found for this or any previous visit (from the past 12 hour(s)). Radiologic Studies -  
CT Results  (Last 48 hours) 09/19/18 1818  CT ABD PELV WO CONT Final result Impression:  IMPRESSION:  
   
Moderate right hydronephrosis secondary to a obstructing calculus at the right  
ureteral pelvic junction measuring 5 mm. Bilateral sacroiliitis Narrative:  EXAM: CT of the abdomen and pelvis INDICATION: Flank pain stone disease right flank pain COMPARISON: February 28, 2010 TECHNIQUE: Axial CT imaging of the abdomen and pelvis was performed without  
intravenous contrast. Multiplanar reformats were generated. One or more dose reduction techniques were used on this CT: automated exposure control,  
adjustment of the Ams and/or kVp according to patient size, and iterative  
reconstruction techniques. The specific techniques used on this CT exam have  
been documented in the patient's electronic medical record.  
   
_______________ FINDINGS:  
   
LOWER CHEST: Unremarkable. Small lung cysts are seen in the right lung base. LIVER, BILIARY: Liver is normal. No biliary dilation. Gallbladder is  
unremarkable. PANCREAS: Normal.  
   
SPLEEN: Normal.  
   
ADRENALS: Normal.  
   
KIDNEYS: Left kidney demonstrates a 2 mm calculus in the lower pole otherwise  
left kidneys unremarkable. Right kidney is enlarged with moderate right hydronephrosis secondary to an  
obstructing calculus at the right ureteral pelvic junction measuring 5 mm. There  
is perinephric stranding around the right kidney. No hydroureter seen on either  
side. There are no ureteral stones. VASCULATURE: Moderate calcific atherosclerosis present. There is suggestion of  
chronic type B dissection of the infrarenal abdominal aorta. LYMPH NODES: No enlarged lymph nodes. GASTROINTESTINAL TRACT: No bowel dilation or wall thickening. Appendix is  
normal.  
   
PELVIC ORGANS: Urinary bladder is under distended therefore difficult to  
evaluate. BONES: No acute or aggressive osseous abnormalities identified. There is  
sacroiliitis bilaterally left greater than right. OTHER: None.  
   
_______________ CXR Results  (Last 48 hours) None Medications given in the ED- Medications  
fentaNYL citrate (PF) injection 50 mcg (50 mcg IntraVENous Given 9/19/18 1823)  
sodium chloride 0.9 % bolus infusion 1,000 mL (0 mL IntraVENous IV Completed 9/19/18 2109)  
ondansetron (ZOFRAN) injection 4 mg (4 mg IntraVENous Given 9/19/18 1823)  
tamsulosin (FLOMAX) capsule 0.4 mg (0.4 mg Oral Given 9/19/18 1823) ketorolac (TORADOL) injection 15 mg (15 mg IntraVENous Given 9/19/18 1900) morphine injection 4 mg (4 mg IntraVENous Given 9/19/18 1900) ondansetron (ZOFRAN) injection 4 mg (4 mg IntraVENous Given 9/19/18 1900) Medical Decision Making I am the first provider for this patient. I reviewed the vital signs, available nursing notes, past medical history, past surgical history, family history and social history. Vital Signs-Reviewed the patient's vital signs. Pulse Oximetry Analysis - 100% on room air EKG interpretation: (Preliminary) 7:28 PM  
Sinus rhythm with occasional PVCs and possible PACs. Rate 80 bpm. Nonspecific T wave abnormality. No ST elevation EKG read by Leon Waddell PA-C Records Reviewed: Nursing Notes Provider Notes (Medical Decision Making): stone, UTI/pyelo, appy, colitis, diverticulitis, MSK. Doubt dissection Procedures: 
Procedures ED Course:  
5:58 PM Initial assessment performed. The patients presenting problems have been discussed, and they are in agreement with the care plan formulated and outlined with them. I have encouraged them to ask questions as they arise throughout their visit. 8:16 PM Patient's pain is down to 1/10. Awaiting urine. CT reveals 5 mm stone. Will tx sxs. Urology FU. Feels much better. Appreciative of care. Reasons to RTED discussed with pt. All questions answered. Pt feels comfortable going home at this time. Pt expressed understanding and she agrees with plan. Diagnosis and Disposition DISCHARGE NOTE: 
8:52 PM  
Nitesh Abby Núñez's  results have been reviewed with her. She has been counseled regarding her diagnosis, treatment, and plan. She verbally conveys understanding and agreement of the signs, symptoms, diagnosis, treatment and prognosis and additionally agrees to follow up as discussed.   She also agrees with the care-plan and conveys that all of her questions have been answered. I have also provided discharge instructions for her that include: educational information regarding their diagnosis and treatment, and list of reasons why they would want to return to the ED prior to their follow-up appointment, should her condition change. She has been provided with education for proper emergency department utilization. CLINICAL IMPRESSION: 
 
1. Ureteral stone with hydronephrosis PLAN: 
1. D/C Home 2. Discharge Medication List as of 9/19/2018  8:52 PM  
  
START taking these medications Details  
oxyCODONE-acetaminophen (PERCOCET) 5-325 mg per tablet Take 1 Tab by mouth every four (4) hours as needed for Pain. Max Daily Amount: 6 Tabs., Print, Disp-16 Tab, R-0  
  
ondansetron (ZOFRAN ODT) 4 mg disintegrating tablet Take 1 Tab by mouth every eight (8) hours as needed for Nausea (or vomiting.). Allow to dissolve on tongue, Print, Disp-12 Tab, R-0  
  
tamsulosin (FLOMAX) 0.4 mg capsule Take 1 Cap by mouth nightly for 7 days. Take nightly to help pass stone, Print, Disp-7 Cap, R-0  
  
cephALEXin (KEFLEX) 500 mg capsule Take 1 Cap by mouth two (2) times a day for 7 days. Indications: BACTERIAL URINARY TRACT INFECTION, Print, Disp-14 Cap, R-0  
  
  
CONTINUE these medications which have NOT CHANGED Details  
diclofenac EC (VOLTAREN) 75 mg EC tablet Take  by mouth., Historical Med  
  
aspirin 81 mg chewable tablet Take 1 Tab by mouth daily. , Print, Disp-30 Tab, R-0  
  
carvedilol (COREG) 6.25 mg tablet Take 1 Tab by mouth two (2) times daily (with meals). , Print, Disp-60 Tab, R-0  
  
lisinopril (PRINIVIL, ZESTRIL) 5 mg tablet Take 1 Tab by mouth daily. , Print, Disp-30 Tab, R-0  
  
simvastatin (ZOCOR) 20 mg tablet Take 1 Tab by mouth nightly. , Print, Disp-30 Tab, R-0  
  
ticagrelor (BRILINTA) 90 mg tablet Take 1 Tab by mouth two (2) times a day., Normal, Disp-60 Tab, R-0  
  
levothyroxine (SYNTHROID) 125 mcg tablet Take 125 mcg by mouth Daily (before breakfast). , Historical Med  
  
nitroglycerin (NITROSTAT) 0.4 mg SL tablet by SubLINGual route every five (5) minutes as needed for Chest Pain., Historical Med 3. Follow-up Information Follow up With Details Comments Contact Info Andrea Garcia MD Schedule an appointment as soon as possible for a visit in 2 days For urology follow up [address] [facility] [address] [facility] [address] 
[phone] [facility] EMERGENCY DEPT  As needed, If symptoms worsen [address] [place] [address] 
[phone]  
  
 
_______________________________ Attestations: This note is prepared by Tayla Romero, acting as Scribe for Mckenna Mcmullen PA-C. Mckenna Mcmullen PA-C:  The scribe's documentation has been prepared under my direction and personally reviewed by me in its entirety. I confirm that the note above accurately reflects all work, treatment, procedures, and medical decision making performed by me. 
_______________________________

## 2018-09-20 NOTE — DISCHARGE INSTRUCTIONS
Learning About Hydronephrosis  What is hydronephrosis? Hydronephrosis is swelling of the kidneys. It is caused by a buildup of urine. This condition can happen if a tube that drains urine from your kidneys is blocked. The blockage can come from within the urinary tract or from pressure outside of the tract. Pregnancy is an example of an outside (external) cause. This condition is often caused by a blockage such as a kidney stone, tumor, or blood clot. It also can be caused by a problem in your urinary system that you were born with (congenital problem). What are the symptoms? Some of the common symptoms are:  · Pain in one or both sides. · Stomach pain. · Blood in your urine. Some people have no symptoms. How is it diagnosed? Your doctor will do an ultrasound to look for a blockage in your urinary system. An ultrasound allows your doctor to see a picture of the organs and other structures in your belly (abdomen). You also may need blood and urine tests. How is it treated? Your treatment depends on the cause of the swelling. If it is caused by a blockage, your treatment will depend on the type of blockage you have. If the blockage is caused by a kidney stone, you may wait for the stone to pass. If hydronephrosis happens during pregnancy, it usually clears up on its own. You may need to have urine drained from your bladder or kidneys. A urinary catheter is a small, flexible tube that can be inserted through the urethra and into the bladder, allowing urine to drain. A nephrostomy catheter is a thin tube placed into your kidney to drain urine. Sometimes surgery is needed to clear the blockage. If you have a blockage, you should begin to feel better after the blockage is gone. Many people recover and have no long-term problems. But some may have kidney damage. If hydronephrosis was left untreated for a long time, the damage can be severe. Severe damage will require further treatment.   Follow-up care is a key part of your treatment and safety. Be sure to make and go to all appointments, and call your doctor if you are having problems. It's also a good idea to know your test results and keep a list of the medicines you take. Where can you learn more? Go to http://peyton-leandra.info/. Enter S386 in the search box to learn more about \"Learning About Hydronephrosis. \"  Current as of: May 12, 2017  Content Version: 11.7  © 7768-8133 [employer]. Care instructions adapted under license by [employer] (which disclaims liability or warranty for this information). If you have questions about a medical condition or this instruction, always ask your healthcare professional. [address] any warranty or liability for your use of this information. Kidney Stone: Care Instructions  Your Care Instructions    Kidney stones are formed when salts, minerals, and other substances normally found in the urine clump together. They can be as small as grains of sand or, rarely, as large as golf balls. While the stone is traveling through the ureter, which is the tube that carries urine from the kidney to the bladder, you will probably feel pain. The pain may be mild or very severe. You may also have some blood in your urine. As soon as the stone reaches the bladder, any intense pain should go away. If a stone is too large to pass on its own, you may need a medical procedure to help you pass the stone. The doctor has checked you carefully, but problems can develop later. If you notice any problems or new symptoms, get medical treatment right away. Follow-up care is a key part of your treatment and safety. Be sure to make and go to all appointments, and call your doctor if you are having problems. It's also a good idea to know your test results and keep a list of the medicines you take. How can you care for yourself at home?   · Drink plenty of fluids, enough so that your urine is light yellow or clear like water. If you have kidney, heart, or liver disease and have to limit fluids, talk with your doctor before you increase the amount of fluids you drink. · Take pain medicines exactly as directed. Call your doctor if you think you are having a problem with your medicine. ¨ If the doctor gave you a prescription medicine for pain, take it as prescribed. ¨ If you are not taking a prescription pain medicine, ask your doctor if you can take an over-the-counter medicine. Read and follow all instructions on the label. · Your doctor may ask you to strain your urine so that you can collect your kidney stone when it passes. You can use a kitchen strainer or a tea strainer to catch the stone. Store it in a plastic bag until you see your doctor again. Preventing future kidney stones  Some changes in your diet may help prevent kidney stones. Depending on the cause of your stones, your doctor may recommend that you:  · Drink plenty of fluids, enough so that your urine is light yellow or clear like water. If you have kidney, heart, or liver disease and have to limit fluids, talk with your doctor before you increase the amount of fluids you drink. · Limit coffee, tea, and alcohol. Also avoid grapefruit juice. · Do not take more than the recommended daily dose of vitamins C and D.  · Avoid antacids such as Gaviscon, Maalox, Mylanta, or Tums. · Limit the amount of salt (sodium) in your diet. · Eat a balanced diet that is not too high in protein. · Limit foods that are high in a substance called oxalate, which can cause kidney stones. These foods include dark green vegetables, rhubarb, chocolate, wheat bran, nuts, cranberries, and beans. When should you call for help?   Call your doctor now or seek immediate medical care if:    · You cannot keep down fluids.     · Your pain gets worse.     · You have a fever or chills.     · You have new or worse pain in your back just below your rib cage (the flank area).     · You have new or more blood in your urine.    Watch closely for changes in your health, and be sure to contact your doctor if:    · You do not get better as expected. Where can you learn more? Go to http://peyton-leandra.info/. Enter V989 in the search box to learn more about \"Kidney Stone: Care Instructions. \"  Current as of: May 12, 2017  Content Version: 11.7  © 9304-1193 [employer]. Care instructions adapted under license by [employer] (which disclaims liability or warranty for this information). If you have questions about a medical condition or this instruction, always ask your healthcare professional. [address] any warranty or liability for your use of this information.

## 2018-10-28 LAB
ATRIAL RATE: 80 BPM
CALCULATED P AXIS, ECG09: 70 DEGREES
CALCULATED R AXIS, ECG10: 52 DEGREES
CALCULATED T AXIS, ECG11: 71 DEGREES
DIAGNOSIS, 93000: NORMAL
P-R INTERVAL, ECG05: 152 MS
Q-T INTERVAL, ECG07: 380 MS
QRS DURATION, ECG06: 82 MS
QTC CALCULATION (BEZET), ECG08: 438 MS
VENTRICULAR RATE, ECG03: 80 BPM

## 2018-11-06 ENCOUNTER — HOSPITAL ENCOUNTER (OUTPATIENT)
Dept: PREADMISSION TESTING | Age: 58
Discharge: HOME | End: 2018-11-06
Payer: OTHER GOVERNMENT

## 2018-11-06 DIAGNOSIS — N13.2 HYDRONEPHROSIS WITH RENAL AND URETERAL CALCULUS OBSTRUCTION: ICD-10-CM

## 2018-11-06 LAB
ANION GAP SERPL CALC-SCNC: 8 MMOL/L (ref 3–18)
ATRIAL RATE: 75 BPM
BASOPHILS # BLD: 0 K/UL (ref 0–0.1)
BASOPHILS NFR BLD: 0 % (ref 0–2)
BUN SERPL-MCNC: 11 MG/DL (ref 7–18)
BUN/CREAT SERPL: 16
CALCIUM SERPL-MCNC: 8.8 MG/DL (ref 8.5–10.1)
CALCULATED P AXIS, ECG09: 68 DEGREES
CALCULATED R AXIS, ECG10: 43 DEGREES
CALCULATED T AXIS, ECG11: 60 DEGREES
CHLORIDE SERPL-SCNC: 107 MMOL/L (ref 100–108)
CO2 SERPL-SCNC: 27 MMOL/L (ref 21–32)
CREAT SERPL-MCNC: 0.69 MG/DL (ref 0.6–1.3)
DIAGNOSIS, 93000: NORMAL
DIFFERENTIAL METHOD BLD: ABNORMAL
EOSINOPHIL # BLD: 0.2 K/UL (ref 0–0.4)
EOSINOPHIL NFR BLD: 2 % (ref 0–5)
ERYTHROCYTE [DISTWIDTH] IN BLOOD BY AUTOMATED COUNT: 12.8 % (ref 11.6–14.5)
GLUCOSE SERPL-MCNC: 87 MG/DL (ref 74–99)
HCT VFR BLD AUTO: 43.7 % (ref 35–45)
HGB BLD-MCNC: 14.5 G/DL (ref 12–16)
LYMPHOCYTES # BLD: 1.7 K/UL (ref 0.9–3.6)
LYMPHOCYTES NFR BLD: 16 % (ref 21–52)
MCH RBC QN AUTO: 31.3 PG (ref 24–34)
MCHC RBC AUTO-ENTMCNC: 33.2 G/DL (ref 31–37)
MCV RBC AUTO: 94.2 FL (ref 74–97)
MONOCYTES # BLD: 0.5 K/UL (ref 0.05–1.2)
MONOCYTES NFR BLD: 5 % (ref 3–10)
NEUTS SEG # BLD: 8.5 K/UL (ref 1.8–8)
NEUTS SEG NFR BLD: 77 % (ref 40–73)
P-R INTERVAL, ECG05: 142 MS
PLATELET # BLD AUTO: 213 K/UL (ref 135–420)
PMV BLD AUTO: 9.8 FL (ref 9.2–11.8)
POTASSIUM SERPL-SCNC: 4.5 MMOL/L (ref 3.5–5.5)
Q-T INTERVAL, ECG07: 376 MS
QRS DURATION, ECG06: 78 MS
QTC CALCULATION (BEZET), ECG08: 419 MS
RBC # BLD AUTO: 4.64 M/UL (ref 4.2–5.3)
SODIUM SERPL-SCNC: 142 MMOL/L (ref 136–145)
VENTRICULAR RATE, ECG03: 75 BPM
WBC # BLD AUTO: 10.9 K/UL (ref 4.6–13.2)

## 2018-11-06 PROCEDURE — 80048 BASIC METABOLIC PNL TOTAL CA: CPT | Performed by: UROLOGY

## 2018-11-06 PROCEDURE — 85025 COMPLETE CBC W/AUTO DIFF WBC: CPT | Performed by: UROLOGY

## 2018-11-06 PROCEDURE — 93005 ELECTROCARDIOGRAM TRACING: CPT

## 2018-11-06 PROCEDURE — 36415 COLL VENOUS BLD VENIPUNCTURE: CPT | Performed by: UROLOGY

## 2018-11-12 PROBLEM — N20.0 RENAL STONE: Status: ACTIVE | Noted: 2018-11-12

## 2018-11-13 ENCOUNTER — ANESTHESIA (OUTPATIENT)
Dept: SURGERY | Age: 58
End: 2018-11-13
Payer: OTHER GOVERNMENT

## 2018-11-13 ENCOUNTER — APPOINTMENT (OUTPATIENT)
Dept: GENERAL RADIOLOGY | Age: 58
End: 2018-11-13
Attending: UROLOGY
Payer: OTHER GOVERNMENT

## 2018-11-13 ENCOUNTER — HOSPITAL ENCOUNTER (OUTPATIENT)
Age: 58
Setting detail: OUTPATIENT SURGERY
Discharge: HOME | End: 2018-11-13
Attending: UROLOGY | Admitting: UROLOGY
Payer: OTHER GOVERNMENT

## 2018-11-13 ENCOUNTER — ANESTHESIA EVENT (OUTPATIENT)
Dept: SURGERY | Age: 58
End: 2018-11-13
Payer: OTHER GOVERNMENT

## 2018-11-13 VITALS
RESPIRATION RATE: 15 BRPM | BODY MASS INDEX: 31.25 KG/M2 | HEART RATE: 85 BPM | TEMPERATURE: 98.2 F | DIASTOLIC BLOOD PRESSURE: 93 MMHG | OXYGEN SATURATION: 96 % | SYSTOLIC BLOOD PRESSURE: 163 MMHG | WEIGHT: 187.56 LBS | HEIGHT: 65 IN

## 2018-11-13 DIAGNOSIS — N13.2 URETERAL STONE WITH HYDRONEPHROSIS: ICD-10-CM

## 2018-11-13 LAB — GLUCOSE BLD STRIP.AUTO-MCNC: 106 MG/DL (ref 70–110)

## 2018-11-13 PROCEDURE — 77030013079 HC BLNKT BAIR HGGR 3M -A: Performed by: NURSE ANESTHETIST, CERTIFIED REGISTERED

## 2018-11-13 PROCEDURE — 74011250636 HC RX REV CODE- 250/636: Performed by: UROLOGY

## 2018-11-13 PROCEDURE — C1758 CATHETER, URETERAL: HCPCS | Performed by: UROLOGY

## 2018-11-13 PROCEDURE — 74011250636 HC RX REV CODE- 250/636: Performed by: ANESTHESIOLOGY

## 2018-11-13 PROCEDURE — 77030005526 HC CATH URETH FOL 2W COVD –A: Performed by: UROLOGY

## 2018-11-13 PROCEDURE — 77030010103 HC SEAL PRT ENDOSC OCOA -B: Performed by: UROLOGY

## 2018-11-13 PROCEDURE — C1894 INTRO/SHEATH, NON-LASER: HCPCS | Performed by: UROLOGY

## 2018-11-13 PROCEDURE — 76210000026 HC REC RM PH II 1 TO 1.5 HR: Performed by: UROLOGY

## 2018-11-13 PROCEDURE — C2617 STENT, NON-COR, TEM W/O DEL: HCPCS | Performed by: UROLOGY

## 2018-11-13 PROCEDURE — 77030032490 HC SLV COMPR SCD KNE COVD -B: Performed by: UROLOGY

## 2018-11-13 PROCEDURE — 77030018836 HC SOL IRR NACL ICUM -A: Performed by: UROLOGY

## 2018-11-13 PROCEDURE — 74420 UROGRAPHY RTRGR +-KUB: CPT

## 2018-11-13 PROCEDURE — 74011000250 HC RX REV CODE- 250

## 2018-11-13 PROCEDURE — 82962 GLUCOSE BLOOD TEST: CPT

## 2018-11-13 PROCEDURE — 77030012508 HC MSK AIRWY LMA AMBU -A: Performed by: NURSE ANESTHETIST, CERTIFIED REGISTERED

## 2018-11-13 PROCEDURE — 74011250636 HC RX REV CODE- 250/636

## 2018-11-13 PROCEDURE — 74011636320 HC RX REV CODE- 636/320: Performed by: UROLOGY

## 2018-11-13 PROCEDURE — C1769 GUIDE WIRE: HCPCS | Performed by: UROLOGY

## 2018-11-13 PROCEDURE — 77030014023 HC SYR INFL LVEEN BSC -B: Performed by: UROLOGY

## 2018-11-13 PROCEDURE — 74011250637 HC RX REV CODE- 250/637: Performed by: ANESTHESIOLOGY

## 2018-11-13 PROCEDURE — 76210000006 HC OR PH I REC 0.5 TO 1 HR: Performed by: UROLOGY

## 2018-11-13 PROCEDURE — 77030020782 HC GWN BAIR PAWS FLX 3M -B: Performed by: UROLOGY

## 2018-11-13 PROCEDURE — 77030006974 HC BSKT URET RTVR BSC -C: Performed by: UROLOGY

## 2018-11-13 PROCEDURE — 76060000032 HC ANESTHESIA 0.5 TO 1 HR: Performed by: UROLOGY

## 2018-11-13 PROCEDURE — 76010000138 HC OR TIME 0.5 TO 1 HR: Performed by: UROLOGY

## 2018-11-13 DEVICE — VARIABLE LENGTH URETERAL STENT
Type: IMPLANTABLE DEVICE | Site: URETER | Status: FUNCTIONAL
Brand: CONTOUR VL™

## 2018-11-13 RX ORDER — GLYCOPYRROLATE 0.2 MG/ML
INJECTION INTRAMUSCULAR; INTRAVENOUS AS NEEDED
Status: DISCONTINUED | OUTPATIENT
Start: 2018-11-13 | End: 2018-11-13 | Stop reason: HOSPADM

## 2018-11-13 RX ORDER — SODIUM CHLORIDE, SODIUM LACTATE, POTASSIUM CHLORIDE, CALCIUM CHLORIDE 600; 310; 30; 20 MG/100ML; MG/100ML; MG/100ML; MG/100ML
125 INJECTION, SOLUTION INTRAVENOUS CONTINUOUS
Status: DISCONTINUED | OUTPATIENT
Start: 2018-11-13 | End: 2018-11-13 | Stop reason: HOSPADM

## 2018-11-13 RX ORDER — SODIUM CHLORIDE, SODIUM LACTATE, POTASSIUM CHLORIDE, CALCIUM CHLORIDE 600; 310; 30; 20 MG/100ML; MG/100ML; MG/100ML; MG/100ML
100 INJECTION, SOLUTION INTRAVENOUS CONTINUOUS
Status: DISCONTINUED | OUTPATIENT
Start: 2018-11-13 | End: 2018-11-13 | Stop reason: HOSPADM

## 2018-11-13 RX ORDER — ONDANSETRON 2 MG/ML
INJECTION INTRAMUSCULAR; INTRAVENOUS AS NEEDED
Status: DISCONTINUED | OUTPATIENT
Start: 2018-11-13 | End: 2018-11-13 | Stop reason: HOSPADM

## 2018-11-13 RX ORDER — LIDOCAINE HYDROCHLORIDE 20 MG/ML
INJECTION, SOLUTION EPIDURAL; INFILTRATION; INTRACAUDAL; PERINEURAL AS NEEDED
Status: DISCONTINUED | OUTPATIENT
Start: 2018-11-13 | End: 2018-11-13 | Stop reason: HOSPADM

## 2018-11-13 RX ORDER — FENTANYL CITRATE 50 UG/ML
INJECTION, SOLUTION INTRAMUSCULAR; INTRAVENOUS AS NEEDED
Status: DISCONTINUED | OUTPATIENT
Start: 2018-11-13 | End: 2018-11-13 | Stop reason: HOSPADM

## 2018-11-13 RX ORDER — FLUMAZENIL 0.1 MG/ML
0.2 INJECTION INTRAVENOUS
Status: DISCONTINUED | OUTPATIENT
Start: 2018-11-13 | End: 2018-11-13 | Stop reason: HOSPADM

## 2018-11-13 RX ORDER — PROPOFOL 10 MG/ML
INJECTION, EMULSION INTRAVENOUS AS NEEDED
Status: DISCONTINUED | OUTPATIENT
Start: 2018-11-13 | End: 2018-11-13 | Stop reason: HOSPADM

## 2018-11-13 RX ORDER — ONDANSETRON 2 MG/ML
4 INJECTION INTRAMUSCULAR; INTRAVENOUS ONCE
Status: COMPLETED | OUTPATIENT
Start: 2018-11-13 | End: 2018-11-13

## 2018-11-13 RX ORDER — CEPHALEXIN 250 MG/1
500 CAPSULE ORAL 2 TIMES DAILY
Qty: 10 CAP | Refills: 0 | Status: SHIPPED | OUTPATIENT
Start: 2018-11-13

## 2018-11-13 RX ORDER — OXYCODONE AND ACETAMINOPHEN 5; 325 MG/1; MG/1
1 TABLET ORAL
Qty: 35 TAB | Refills: 0 | Status: SHIPPED | OUTPATIENT
Start: 2018-11-13

## 2018-11-13 RX ORDER — MIDAZOLAM HYDROCHLORIDE 1 MG/ML
INJECTION, SOLUTION INTRAMUSCULAR; INTRAVENOUS AS NEEDED
Status: DISCONTINUED | OUTPATIENT
Start: 2018-11-13 | End: 2018-11-13 | Stop reason: HOSPADM

## 2018-11-13 RX ORDER — KETOROLAC TROMETHAMINE 30 MG/ML
30 INJECTION, SOLUTION INTRAMUSCULAR; INTRAVENOUS
Status: COMPLETED | OUTPATIENT
Start: 2018-11-13 | End: 2018-11-13

## 2018-11-13 RX ORDER — CEFAZOLIN SODIUM/WATER 2 G/20 ML
2 SYRINGE (ML) INTRAVENOUS ONCE
Status: COMPLETED | OUTPATIENT
Start: 2018-11-13 | End: 2018-11-13

## 2018-11-13 RX ORDER — OXYCODONE AND ACETAMINOPHEN 5; 325 MG/1; MG/1
1 TABLET ORAL AS NEEDED
Status: DISCONTINUED | OUTPATIENT
Start: 2018-11-13 | End: 2018-11-13 | Stop reason: HOSPADM

## 2018-11-13 RX ORDER — NALOXONE HYDROCHLORIDE 0.4 MG/ML
0.4 INJECTION, SOLUTION INTRAMUSCULAR; INTRAVENOUS; SUBCUTANEOUS AS NEEDED
Status: DISCONTINUED | OUTPATIENT
Start: 2018-11-13 | End: 2018-11-13 | Stop reason: HOSPADM

## 2018-11-13 RX ORDER — FENTANYL CITRATE 50 UG/ML
50 INJECTION, SOLUTION INTRAMUSCULAR; INTRAVENOUS
Status: DISCONTINUED | OUTPATIENT
Start: 2018-11-13 | End: 2018-11-13 | Stop reason: HOSPADM

## 2018-11-13 RX ADMIN — ONDANSETRON 4 MG: 2 INJECTION INTRAMUSCULAR; INTRAVENOUS at 15:28

## 2018-11-13 RX ADMIN — OXYCODONE AND ACETAMINOPHEN 1 TABLET: 5; 325 TABLET ORAL at 17:28

## 2018-11-13 RX ADMIN — PROPOFOL 200 MG: 10 INJECTION, EMULSION INTRAVENOUS at 15:34

## 2018-11-13 RX ADMIN — KETOROLAC TROMETHAMINE 30 MG: 30 INJECTION, SOLUTION INTRAMUSCULAR at 18:00

## 2018-11-13 RX ADMIN — GLYCOPYRROLATE 0.2 MG: 0.2 INJECTION INTRAMUSCULAR; INTRAVENOUS at 15:28

## 2018-11-13 RX ADMIN — FENTANYL CITRATE 100 MCG: 50 INJECTION, SOLUTION INTRAMUSCULAR; INTRAVENOUS at 15:28

## 2018-11-13 RX ADMIN — SODIUM CHLORIDE, SODIUM LACTATE, POTASSIUM CHLORIDE, AND CALCIUM CHLORIDE 100 ML/HR: 600; 310; 30; 20 INJECTION, SOLUTION INTRAVENOUS at 16:37

## 2018-11-13 RX ADMIN — MIDAZOLAM HYDROCHLORIDE 2 MG: 1 INJECTION, SOLUTION INTRAMUSCULAR; INTRAVENOUS at 15:28

## 2018-11-13 RX ADMIN — LIDOCAINE HYDROCHLORIDE 100 MG: 20 INJECTION, SOLUTION EPIDURAL; INFILTRATION; INTRACAUDAL; PERINEURAL at 15:34

## 2018-11-13 RX ADMIN — Medication 2 G: at 15:28

## 2018-11-13 RX ADMIN — ONDANSETRON 4 MG: 2 INJECTION INTRAMUSCULAR; INTRAVENOUS at 17:35

## 2018-11-13 RX ADMIN — SODIUM CHLORIDE, SODIUM LACTATE, POTASSIUM CHLORIDE, AND CALCIUM CHLORIDE 125 ML/HR: 600; 310; 30; 20 INJECTION, SOLUTION INTRAVENOUS at 13:42

## 2018-11-13 NOTE — DISCHARGE INSTRUCTIONS
Cystoscopy: What to Expect at [address] [facility]    A cystoscopy is a procedure that lets a doctor look inside of the bladder and the urethra. The urethra is the tube that carries urine from the bladder to outside the body. The doctor uses a thin, lighted tool called a cystoscope. Your bladder is filled with fluid. This stretches the bladder so that your doctor can look closely at the inside of your bladder. After the cystoscopy, your urethra may be sore at first, and it may burn when you urinate for the first few days after the procedure. You may feel the need to urinate more often, and your urine may be pink. These symptoms should get better in 1 or 2 days. You will probably be able to go back to most of your usual activities in 1 or 2 days. This care sheet gives you a general idea about how long it will take for you to recover. But each person recovers at a different pace. Follow the steps below to get better as quickly as possible. How can you care for yourself at home? Activity    · Rest when you feel tired. Getting enough sleep will help you recover.     · Try to walk each day. Start by walking a little more than you did the day before. Bit by bit, increase the amount you walk. Walking boosts blood flow and helps prevent pneumonia and constipation.     · Avoid strenuous activities, such as bicycle riding, jogging, weight lifting, or aerobic exercise, until your doctor says it is okay.     · Ask your doctor when you can drive again.     · Most people are able to return to work within 1 or 2 days after the procedure.     · You may shower and take baths as usual.     · Ask your doctor when it is okay for you to have sex. Diet    · You can eat your normal diet. If your stomach is upset, try bland, low-fat foods like plain rice, broiled chicken, toast, and yogurt.     · Drink plenty of fluids (unless your doctor tells you not to). Medicines    · Take pain medicines exactly as directed.   ? If the doctor gave you a prescription medicine for pain, take it as prescribed. ? If you are not taking a prescription pain medicine, ask your doctor if you can take an over-the-counter medicine.     · If you think your pain medicine is making you sick to your stomach:  ? Take your medicine after meals (unless your doctor has told you not to). ? Ask your doctor for a different pain medicine.     · If your doctor prescribed antibiotics, take them as directed. Do not stop taking them just because you feel better. You need to take the full course of antibiotics. Follow-up care is a key part of your treatment and safety. Be sure to make and go to all appointments, and call your doctor if you are having problems. It's also a good idea to know your test results and keep a list of the medicines you take. When should you call for help? Call 911 anytime you think you may need emergency care. For example, call if:    · You passed out (lost consciousness).     · You have severe trouble breathing.     · You have sudden chest pain and shortness of breath, or you cough up blood.     · You have severe belly pain.    Call your doctor now or seek immediate medical care if:    · You are sick to your stomach or cannot keep fluids down.     · Your urine is still red or you see blood clots after you have urinated several times.     · You have trouble passing urine or stool, especially if you have pain or swelling in your lower belly.     · You have signs of a blood clot, such as:  ? Pain in your calf, back of the knee, thigh, or groin. ? Redness and swelling in your leg or groin.     · You develop a fever or severe chills.     · You have pain in your back just below your rib cage. This is called flank pain.    Watch closely for changes in your health, and be sure to contact your doctor if:    · You have pain or burning when you urinate.  A burning feeling is normal for a day or two after the test, but call if it does not get better.     · You have a frequent urge to urinate but can pass only small amounts of urine.     · Your urine is pink, red, or cloudy, or smells bad. It is normal for the urine to have a pinkish color for a few days after the test, but call if it does not get better. Where can you learn more? Go to http://peyton-leandra.info/. Enter I779 in the search box to learn more about \"Cystoscopy: What to Expect at Home. \"  Current as of: March 21, 2018  Content Version: 11.8  © 2774-2305 [employer]. Care instructions adapted under license by [employer] (which disclaims liability or warranty for this information). If you have questions about a medical condition or this instruction, always ask your healthcare professional. Valentin Hinkletron any warranty or liability for your use of this information. Lithotripsy: What to Expect at [address] [place] [address] is a way to treat kidney stones without surgery. It is also called extracorporeal shock wave lithotripsy, or ESWL. This treatment uses sound waves to break kidney stones into tiny pieces. These pieces can then pass out of the body in the urine. You may have a small amount of blood in your urine after this treatment. Your urine may be slightly pink or reddish. The blood in the urine often goes away after 2 days. You may have a plastic tube inside one of your ureters. Ureters are the tubes that connect the kidneys to the bladder. The plastic tube is called a stent. It takes urine from your kidney to your bladder. This lets the stone pass more easily. Your doctor may remove the stent in about a week or two. This care sheet gives you a general idea about how long it will take for you to recover. But each person recovers at a different pace. Follow the steps below to feel better as quickly as possible. How can you care for yourself at home?   Activity    · Rest as much as you need to after you go home.     · You may do your regular activities. But avoid hard exercise or sports for a week. Wait until there is no blood in your urine and the stent is out. Diet    · You can eat your normal diet.     · Drink plenty of fluids, enough so that your urine is light yellow or clear like water. If you have kidney, heart, or liver disease and have to limit fluids, talk with your doctor before you increase the amount of fluids you drink. Medicines    · Your doctor will tell you if and when you can restart your medicines. He or she will also give you instructions about taking any new medicines.     · If you take blood thinners, such as warfarin (Coumadin), clopidogrel (Plavix), or aspirin, be sure to talk to your doctor. He or she will tell you if and when to start taking those medicines again. Make sure that you understand exactly what your doctor wants you to do.     · Be safe with medicines. Read and follow all instructions on the label. ? If the doctor gave you a prescription medicine for pain, take it as prescribed. ? If you are not taking a prescription pain medicine, ask your doctor if you can take acetaminophen (Tylenol). Do not take ibuprofen (Advil, Motrin) or naproxen (Aleve), or similar medicines unless your doctor tells you to. ? Do not take two or more pain medicines at the same time unless the doctor told you to. Many pain medicines have acetaminophen, which is Tylenol. Too much acetaminophen (Tylenol) can be harmful. Other instructions    · Urinate through the strainer the doctor gives you. Save any stone pieces, including those that look like sand or gravel. Take these to your doctor. This will help your doctor find the cause of your stones. Follow-up care is a key part of your treatment and safety. Be sure to make and go to all appointments, and call your doctor if you are having problems. It's also a good idea to know your test results and keep a list of the medicines you take.   When should you call for help?  Call 911 anytime you think you may need emergency care. For example, call if:    · You passed out (lost consciousness).     · You have chest pain, are short of breath, or cough up blood.    Call your doctor now or seek immediate medical care if:    · You have pain that does not get better after you take pain medicine.     · You have new or more blood clots in your urine. (It is normal for the urine to be pink for a few days.)     · You cannot urinate.     · You have symptoms of a urinary tract infection. These may include:  ? Pain or burning when you urinate. ? A frequent need to urinate without being able to pass much urine. ? Pain in the flank, which is just below the rib cage and above the waist on either side of the back. ? Blood in the urine. ? A fever.     · You are sick to your stomach or cannot drink fluids.     · You have signs of a blood clot in your leg (called a deep vein thrombosis), such as:  ? Pain in the calf, back of the knee, thigh, or groin. ? Redness and swelling in your leg.    Watch closely for any changes in your health, and be sure to contact your doctor if you have any problems. Where can you learn more? Go to http://peyton-leandra.info/. Enter B642 in the search box to learn more about \"Lithotripsy: What to Expect at Home. \"  Current as of: March 15, 2018  Content Version: 11.8  © 9959-9587 [employer]. Care instructions adapted under license by [employer] (which disclaims liability or warranty for this information). If you have questions about a medical condition or this instruction, always ask your healthcare professional. [address] any warranty or liability for your use of this information.       DISCHARGE SUMMARY from Nurse    PATIENT INSTRUCTIONS:    After general anesthesia or intravenous sedation, for 24 hours or while taking prescription Narcotics:  · Limit your activities  · Do not drive and operate hazardous machinery  · Do not make important personal or business decisions  · Do  not drink alcoholic beverages  · If you have not urinated within 8 hours after discharge, please contact your surgeon on call. Report the following to your surgeon:  · Excessive pain, swelling, redness or odor of or around the surgical area  · Temperature over 100.5  · Nausea and vomiting lasting longer than 4 hours or if unable to take medications  · Any signs of decreased circulation or nerve impairment to extremity: change in color, persistent  numbness, tingling, coldness or increase pain  · Any questions    What to do at Home:  Recommended activity: Activity as tolerated, Ambulate in house and No driving while on analgesics,     If you experience any of the following symptoms above, please follow up with Dr. Ry Ramos. *  Please give a list of your current medications to your Primary Care Provider. *  Please update this list whenever your medications are discontinued, doses are      changed, or new medications (including over-the-counter products) are added. *  Please carry medication information at all times in case of emergency situations. These are general instructions for a healthy lifestyle:    No smoking/ No tobacco products/ Avoid exposure to second hand smoke  Surgeon General's Warning:  Quitting smoking now greatly reduces serious risk to your health. Obesity, smoking, and sedentary lifestyle greatly increases your risk for illness    A healthy diet, regular physical exercise & weight monitoring are important for maintaining a healthy lifestyle    You may be retaining fluid if you have a history of heart failure or if you experience any of the following symptoms:  Weight gain of 3 pounds or more overnight or 5 pounds in a week, increased swelling in our hands or feet or shortness of breath while lying flat in bed.   Please call your doctor as soon as you notice any of these symptoms; do not wait until your next office visit. Recognize signs and symptoms of STROKE:    F-face looks uneven    A-arms unable to move or move unevenly    S-speech slurred or non-existent    T-time-call 911 as soon as signs and symptoms begin-DO NOT go       Back to bed or wait to see if you get better-TIME IS BRAIN. Warning Signs of HEART ATTACK     Call 911 if you have these symptoms:   Chest discomfort. Most heart attacks involve discomfort in the center of the chest that lasts more than a few minutes, or that goes away and comes back. It can feel like uncomfortable pressure, squeezing, fullness, or pain.  Discomfort in other areas of the upper body. Symptoms can include pain or discomfort in one or both arms, the back, neck, jaw, or stomach.  Shortness of breath with or without chest discomfort.  Other signs may include breaking out in a cold sweat, nausea, or lightheadedness. Don't wait more than five minutes to call 911 - MINUTES MATTER! Fast action can save your life. Calling 911 is almost always the fastest way to get lifesaving treatment. Emergency Medical Services staff can begin treatment when they arrive -- up to an hour sooner than if someone gets to the hospital by car. Patient armband removed and shredded      The discharge information has been reviewed with the patient and caregiver. The patient and caregiver verbalized understanding. Discharge medications reviewed with the patient and caregiver and appropriate educational materials and side effects teaching were provided.   ___________________________________________________________________________________________________________________________________

## 2018-11-13 NOTE — H&P
Urology [facility]et [address]  [address] Tel: [phone] Fax: [phone] Patient: Dorothy Koehler YOB: 1960 Assessment/Plan # Detail Type Description 1. Assessment Hydronephrosis with renal calculous obstruction (N13.2). Patient Plan She had a stone at the UPJ and hydro. Her pain is gone and now the stones seems to have gone in the lower pole. However, she still has hydro. We discussed that this could be due to some residual swelling from the UPJ stone. Or we discussed she could have a UPJ obstruction. We discussed that there's also a possibility there could be a tumor there. We discussed doing a MAG 3 scan vs a CT scan 3 phase vs a ureteroscopy. She wishes to proceed with just the ureteroscopy to eliminate the stone. Risk of bleeding, infection, injury, possible need for more than 1 procedure were discussed and we will do a right ureteroscopy with holmium laser lithotripsy and stent. 2. Other Orders Orders not associated to today's assessments. Plan Orders The patient had the following test(s) completed today: Abdominal/KUB 1 View. This 60year old female presents for Kidney and/or Ureteral Stone. History of Present Illness: 1. Kidney and/or Ureteral Sanjuanita Rust Onset was 1 month ago. Severity level is severe. It occurs intermittently. The problem is improving. Location of pain is right flank. The pain does not radiate. No prior pertinent history. Associated symptoms include hematuria (gross), nausea and vomiting. Pertinent negatives include chills, constipation, diarrhea and fever. Additional information: CT (9/19/18)  --  5mm right prox ureteral stone with hydro       US (10/17/18 - mild right hydro and a right lower pole stone ([id number]). PAST MEDICAL/SURGICAL HISTORY   (Reviewed, updated) Disease/disorder Onset Date Management Date Comments  
heart attack Hypertension Thyroid disease PROBLEM LIST:   Problem List reviewed. Problem Description Onset Date Chronic Clinical Status Notes Non-STEMI (non-ST elevated myocardial infarction) ([facility] 03/26/2018 N Acute chest pain 03/26/2018 N Diabetes mellitus type 2, controlled ([facility] 03/26/2018 N Chest pain 03/26/2018 N    
CAD (coronary artery disease), native coronary artery 03/26/2018 N Medications (active prior to today) Medication Instructions Start Date Stop Date Refilled Elsewhere  
tamsulosin 0.4 mg capsule take 1 capsule by oral route  every day 1/2 hour following the same meal each day 09/21/2018   N  
amoxicillin 500 mg capsule  03/23/2018 10/22/2018  Y  
benzonatate 200 mg capsule  09/26/2017   Y Brilinta 90 mg tablet  03/28/2018   Y  
carvedilol 6.25 mg tablet  03/28/2018   Y  
diclofenac sodium 75 mg tablet,delayed release  02/15/2018   Y  
hydrocodone 7.5 mg-acetaminophen 325 mg tablet  10/16/2017   Y  
isosorbide mononitrate ER 60 mg tablet,extended release 24 hr  06/27/2017   Y  
lisinopril 5 mg tablet  03/28/2018   Y  
metoprolol succinate ER 50 mg tablet,extended release 24 hr  12/31/2017   Y  
metronidazole 500 mg tablet  10/16/2017   Y  
nitroglycerin 0.4 mg sublingual tablet  06/28/2017   Y  
simvastatin 20 mg tablet  03/28/2018   Y  
aspirin 81 mg chewable tablet Take 1 Tab by mouth daily. 03/29/2018   Y Synthroid 125 mcg tablet Take 125 mcg by mouth Daily (before breakfast). //   [facility] 4 mg disintegrating tablet Take 1 Tab by mouth every eight (8) hours as needed for Nausea (or vomiting.). Allow to dissolve on tongue 09/19/2018 10/22/2018  Y  
oxycodone-acetaminophen 5 mg-325 mg tablet Take 1 Tab by mouth every four (4) hours as needed for Pain. Max Daily Amount: 6 Tabs. 09/19/2018   Y Medication Reconciliation Medications reconciled today. Medication Reviewed Adherence Medication Name Sig Desc Elsewhere Status taking as directed tamsulosin 0.4 mg capsule take 1 capsule by oral route  every day 1/2 hour following the same meal each day N Verified  
taking as directed benzonatate 200 mg capsule  Y Verified  
taking as directed Brilinta 90 mg tablet  Y Verified  
taking as directed carvedilol 6.25 mg tablet  Y Verified  
taking as directed diclofenac sodium 75 mg tablet,delayed release  Y Verified  
taking as directed hydrocodone 7.5 mg-acetaminophen 325 mg tablet  Y Verified  
taking as directed isosorbide mononitrate ER 60 mg tablet,extended release 24 hr  Y Verified  
taking as directed lisinopril 5 mg tablet  Y Verified  
taking as directed metoprolol succinate ER 50 mg tablet,extended release 24 hr  Y Verified  
taking as directed metronidazole 500 mg tablet  Y Verified  
taking as directed nitroglycerin 0.4 mg sublingual tablet  Y Verified  
taking as directed simvastatin 20 mg tablet  Y Verified  
taking as directed aspirin 81 mg chewable tablet Take 1 Tab by mouth daily. Y Verified  
taking as directed Synthroid 125 mcg tablet Take 125 mcg by mouth Daily (before breakfast). Y Verified  
taking as directed oxycodone-acetaminophen 5 mg-325 mg tablet Take 1 Tab by mouth every four (4) hours as needed for Pain. Max Daily Amount: 6 Tabs. Y Verified Allergies Ingredient Reaction (Severity) Medication Name Comment NO KNOWN ALLERGIES Reviewed, no changes. Family History  (Reviewed, updated) Relationship Family Member Name  Age at Death Condition Onset Age Cause of Death Family h/o    brain tumor Father    Heart disease Maternal grandfather    Cancer - prostate Mother    Blood disease Paternal grandfather    Alzheimer's Disease Social History:  (Reviewed, updated) Tobacco use reviewed. Preferred language is [place]. Tobacco use status: Occasional cigarette smoker. Smoking status: Current some day smoker.  
 
SMOKING STATUS 
 Use Status Type Smoking Status Usage Per Day Years Used Total Pack Years  
yes Cigarette Current some day smoker ALCOHOL There is a history of alcohol use. consumed rarely. CAFFEINE The patient uses caffeine: coffee - 4 cups a day. Review of Systems System Neg/Pos Details Constitutional Negative Chills and Fever. ENMT Negative Ear infections and Sore throat. Eyes Negative Blurred vision, Double vision and Eye pain. Respiratory Negative Asthma, Chronic cough, Dyspnea and Wheezing. Cardio Negative Chest pain. GI Positive Nausea, Vomiting. GI Negative Constipation, Decreased appetite and Diarrhea.  Positive Hematuria. Endocrine Negative Cold intolerance, Heat intolerance, Increased thirst and Weight loss. Neuro Negative Headache and Tremors. Psych Negative Anxiety and Depression. Integumentary Negative Itching skin and Rash. MS Negative Back pain and Joint pain. Hema/Lymph Negative Easy bleeding. Physical Exam 
Exam Findings Details Constitutional Normal Well developed. Neck Exam Normal Inspection - Normal.  
Respiratory Normal Inspection - Normal.  
Abdomen Normal No abdominal tenderness. Genitourinary Normal No Suprapubic tenderness. No CVA tenderness. Extremity Normal No Edema. Psychiatric Normal Orientation - Oriented to time, place, person & situation. Appropriate mood and affect. Patient Education # Patient Education 1. Learning About Diet for Kidney Stone ~  
 
Medications (added, continued, or stopped today) Start Date Medication Directions PRN Status PRN Reason Instruction Stop Date  
03/23/2018 amoxicillin 500 mg capsule  N   10/22/2018  
03/29/2018 aspirin 81 mg chewable tablet Take 1 Tab by mouth daily.  N     
09/26/2017 benzonatate 200 mg capsule  N     
03/28/2018 Brilinta 90 mg tablet  N     
03/28/2018 carvedilol 6.25 mg tablet  N     
02/15/2018 diclofenac sodium 75 mg tablet,delayed release  N     
 10/16/2017 hydrocodone 7.5 mg-acetaminophen 325 mg tablet  N     
06/27/2017 isosorbide mononitrate ER 60 mg tablet,extended release 24 hr  N     
03/28/2018 lisinopril 5 mg tablet  N     
12/31/2017 metoprolol succinate ER 50 mg tablet,extended release 24 hr  N     
10/16/2017 metronidazole 500 mg tablet  N     
06/28/2017 nitroglycerin 0.4 mg sublingual tablet  N     
09/19/2018 oxycodone-acetaminophen 5 mg-325 mg tablet Take 1 Tab by mouth every four (4) hours as needed for Pain. Max Daily Amount: 6 Tabs. N     
03/28/2018 simvastatin 20 mg tablet  N Synthroid 125 mcg tablet Take 125 mcg by mouth Daily (before breakfast). N     
09/21/2018 tamsulosin 0.4 mg capsule take 1 capsule by oral route  every day 1/2 hour following the same meal each day N     
09/19/2018 Zofran ODT 4 mg disintegrating tablet Take 1 Tab by mouth every eight (8) hours as needed for Nausea (or vomiting.). Allow to dissolve on tongue N   10/22/2018 Active Patient Care Team Members Name Contact Agency Type Support Role Relationship Active Date Inactive Date Specialty [facility] Provider:  
 
 
Janett Swain MD

## 2018-11-13 NOTE — INTERVAL H&P NOTE
H&P Update: 
Prerna Oh was seen and examined. History and physical has been reviewed. The patient has been examined.  There have been no significant clinical changes since the completion of the originally dated History and Physical. 
 
Signed By: Bradly Cole MD   
 November 13, 2018 3:08 PM

## 2018-11-13 NOTE — BRIEF OP NOTE
BRIEF OPERATIVE NOTE Date of Procedure: 11/13/2018 Preoperative Diagnosis: KIDNEY STONES, RIGHT Postoperative Diagnosis: KIDNEY STONES, RIGHT Procedure(s): 
CYSTOSCOPY, RIGHT RETROGRADE PYELOGRAM WITH INTERPRETATION,  RIGHT URETEROSCOPY, LASER LITHOTRIPSY WITH HOLMIUM LASER, STENT PLACEMENT WITH C-ARM, \"SPEC POP\" Surgeon(s) and Role: 
   * Bradley Kruse MD - Primary Surgical Assistant: none Surgical Staff: 
Circ-1: Breanne Flynn RN 
Circ-2: Bahman Hwang RN Radiology Technician: Maranda Francisco Scrub Tech-1: Marci Haro Scrub Tech-2: Nikia Whyte Event Time In Time Out Incision Start [phone] Incision Close 1613 Anesthesia: General  
Estimated Blood Loss: minimal 
Specimens: * No specimens in log * Findings: somewhat tight right ureter, extra-renal pelvis with mild hydro. Stone in renal pelvis was lasered and fragments were turned to dust  
Complications: none Implants:  
Implant Name Type Inv. Item Serial No.  Lot No. LRB No. Used [employer] [id number] -- [place] [id number]  [employer] [id number] -- [place] [id number] Right 1 Implanted

## 2018-11-13 NOTE — ANESTHESIA POSTPROCEDURE EVALUATION
Post-Anesthesia Evaluation and Assessment Cardiovascular Function/Vital Signs Visit Vitals /87 Pulse 83 Temp 37.2 °C (99 °F) Resp 20 Ht 5' 5\" (1.651 m) Wt 85.1 kg (187 lb 9 oz) SpO2 100% BMI 31.21 kg/m² Patient is status post Procedure(s): 
CYSTOSCOPY, RIGHT RETROGRADE PYELOGRAM, WITH INTERPRETATION,  RIGHT URETEROSCOPY, LASER LITHOTRIPSY WITH HOLMIUM LASER, STENT PLACEMENT WITH C-ARM, \"SPEC POP\". Nausea/Vomiting: Controlled. Postoperative hydration reviewed and adequate. Pain: 
Pain Scale 1: Numeric (0 - 10) (11/13/18 1636) Pain Intensity 1: 0 (11/13/18 1636) Managed. Neurological Status:  
Neuro (WDL): Within Defined Limits (11/13/18 1626) At baseline. Mental Status and Level of Consciousness: Arousable. Pulmonary Status:  
O2 Device: Room air (11/13/18 1636) Adequate oxygenation and airway patent. Complications related to anesthesia: None Post-anesthesia assessment completed. No concerns. Patient has met all discharge requirements. Signed By: Parisa Hutchinson CRNA November 13, 2018

## 2018-11-14 NOTE — OP NOTES
[facility]  OPERATIVE REPORT    Masood Lawler  MR#: [id number]  : 1960  ACCOUNT #: [de-identified]   DATE OF SERVICE: 2018    PREOPERATIVE DIAGNOSIS:  Right renal stones. POSTOPERATIVE DIAGNOSIS:  Right renal stones. PROCEDURES PERFORMED:  Cystoscopy, right retrograde pyelogram with interpretation, right ureteroscopy with holmium laser lithotripsy and stent placement. SURGEON:  Marilyn Sanchez MD    ASSISTANT:  None. ANESTHESIA:  General.    ESTIMATED BLOOD LOSS:  Minimal.    SPECIMENS REMOVED:  None. FINDINGS:  The patient had a somewhat tight right ureter and an extrarenal pelvis with mild hydro. Stone in the renal pelvis was lasered and fragments were turned to dust.    COMPLICATIONS:  None. IMPLANT:  A 6-Frisian variable length stent. CLINICAL NOTE:  The patient is a 67-year-old female with right-sided flank pain intermittently. She was found to have a stone in the renal pelvis. She presents for ureteroscopy. OPERATIVE REPORT:  Preoperatively, risks and benefits of surgery were described to the patient. The risks include but are not limited to bleeding, infection, injury to bladder, injury to ureter, injury to the kidney, possible need for more procedure to remain stone free. The patient understood the risks and signed the informed consent. The patient was taken to the operating room and placed on the operating table in supine position. She was administered general anesthetic. She was administered intravenous antibiotics. She was placed in dorsal lithotomy position and prepped and draped in the usual sterile manner. A 21-Frisian cystoscope and sheath was then inserted transurethrally atraumatically under direct vision using a 30 degree lens. Panendoscopy was done. Bilateral ureteral orifices were in normal anatomic position. There were no stones, no tumors noted present within the bladder. The right ureteral orifice was identified.   It was cannulated with a 5-Setswana open-ended ureteral catheter. Retrograde pyelogram was done in the usual manner using 14 mL of Visipaque dye. There were no filling defects in the distal ureter, in the mid ureter and in the proximal ureter. Overall, the caliber was in the normal range, but on the narrow end of normal.  There was mild hydronephrosis and there was a small filling defect in the kidney consistent with her known stone. There was no blunting of calices. There were no other filling defects except for the one in the renal pelvis consistent with stone. Next, I passed a sensor wire through the open-ended catheter up to the kidney. The open-ended catheter was removed. The scope was removed. I then passed a dual lumen catheter, passed a second Sensor wire up to the kidney. The dual lumen catheter was removed. One wire was a safety wire, the other wire was a working wire. Over the working wire, I passed an 11/13 navigator sheath over the wire into the proximal ureter. The inner workings of the sheath and the working wire were removed. I then passed a flexible ureteroscope through the sheath up to the kidney. There was noted to be a little bit of tightness at the UPJ, but I was able to advance the scope without difficulty. Once in the kidney pan pyeloscopy was done. The renal pelvis was wide open and kind of vacuolated. I entered every kelly and finally in a lower mid pole kelly I found the stone, which had moved during the course of the retrograde. I was able to grab the stone with a Zero Tip basket and move it in towards the upper pole. I then lasered the stone into many fragments which was basically just dust.  There were no stone fragments significant enough to even grab with the basket. Once the stones were lasered into dust I looked around the kidney again and saw no fragments more than a millimeter anywhere. The scope was then slowly backed down the ureter.   The ureter was intact and unharmed. I removed the scope completely and then I inserted the cystoscope. Over the safety wire, I passed a 6-Malay variable length stent. The wire was removed. Fluoroscopy confirmed the proximal coil was in the kidney, while the distal coil was noted to be in the bladder by direct vision. At this point, the patient was taken out of lithotomy position, revived from anesthesia and taken to recovery in stable condition.       Gurjit Damico MD       [facility] /   D: 11/13/2018 16:47     T: 11/13/2018 19:50  JOB #: [id number]

## 2019-12-20 NOTE — PERIOP NOTES
Notified Dr. Uriostegui Likes via [address] [place] [address] nurse in room #9 of patient's last dose of 81mg ASA and last dose of Brilinta were 11/12; MD ok to proceed. zpak has been sent in

## 2021-08-03 PROBLEM — R07.9 CHEST PAIN: Status: RESOLVED | Noted: 2018-03-26 | Resolved: 2021-08-03

## (undated) DEVICE — SPONGE GZ W4XL4IN COT 12 PLY TYP VII WVN C FLD DSGN

## (undated) DEVICE — SEAL ENDOSCP INSTR 7FR BX SELF SEAL

## (undated) DEVICE — NITINOL STONE RETRIEVAL BASKET: Brand: ZERO TIP

## (undated) DEVICE — CATHETER URET L70CM OD6FR OPN END FLEXIMA

## (undated) DEVICE — 100% SILICONE FOLEY CATHETER,5-10 ML, 2-WAY: Brand: DOVER

## (undated) DEVICE — KENDALL SCD EXPRESS SLEEVES, KNEE LENGTH, MEDIUM: Brand: KENDALL SCD

## (undated) DEVICE — CYSTO PACK: Brand: MEDLINE INDUSTRIES, INC.

## (undated) DEVICE — TRAP MUCUS SPECIMEN 40ML -- MEDICHOICE

## (undated) DEVICE — DUAL LUMEN URETERAL CATHETER

## (undated) DEVICE — INFLATION DEVICE: Brand: ENCORE 26

## (undated) DEVICE — DEVON™ KNEE AND BODY STRAP 60" X 3" (1.5 M X 7.6 CM): Brand: DEVON

## (undated) DEVICE — URETERAL ACCESS SHEATH SET: Brand: NAVIGATOR

## (undated) DEVICE — SOLUTION IRRIG 3000ML 0.9% SOD CHL FLX CONT 0797208] ICU MEDICAL INC]

## (undated) DEVICE — DRAPE XR C ARM 41X74IN LF --

## (undated) DEVICE — GDWIRE 3CM FLX-TIP 0.038X150CM -- BX/5 SENSOR